# Patient Record
Sex: MALE | Race: WHITE | NOT HISPANIC OR LATINO
[De-identification: names, ages, dates, MRNs, and addresses within clinical notes are randomized per-mention and may not be internally consistent; named-entity substitution may affect disease eponyms.]

---

## 2020-06-19 ENCOUNTER — TRANSCRIPTION ENCOUNTER (OUTPATIENT)
Age: 55
End: 2020-06-19

## 2020-06-26 ENCOUNTER — TRANSCRIPTION ENCOUNTER (OUTPATIENT)
Age: 55
End: 2020-06-26

## 2020-09-19 ENCOUNTER — TRANSCRIPTION ENCOUNTER (OUTPATIENT)
Age: 55
End: 2020-09-19

## 2021-03-30 ENCOUNTER — TRANSCRIPTION ENCOUNTER (OUTPATIENT)
Age: 56
End: 2021-03-30

## 2022-04-26 PROBLEM — Z00.00 ENCOUNTER FOR PREVENTIVE HEALTH EXAMINATION: Status: ACTIVE | Noted: 2022-04-26

## 2022-04-27 ENCOUNTER — RESULT REVIEW (OUTPATIENT)
Age: 57
End: 2022-04-27

## 2022-04-27 ENCOUNTER — APPOINTMENT (OUTPATIENT)
Dept: ORTHOPEDIC SURGERY | Facility: CLINIC | Age: 57
End: 2022-04-27
Payer: COMMERCIAL

## 2022-04-27 VITALS — WEIGHT: 315 LBS | HEIGHT: 72 IN | BODY MASS INDEX: 42.66 KG/M2

## 2022-04-27 DIAGNOSIS — Z78.9 OTHER SPECIFIED HEALTH STATUS: ICD-10-CM

## 2022-04-27 DIAGNOSIS — I10 ESSENTIAL (PRIMARY) HYPERTENSION: ICD-10-CM

## 2022-04-27 DIAGNOSIS — Z87.891 PERSONAL HISTORY OF NICOTINE DEPENDENCE: ICD-10-CM

## 2022-04-27 PROCEDURE — 99214 OFFICE O/P EST MOD 30 MIN: CPT

## 2022-04-27 RX ORDER — OXYCODONE 10 MG/1
10 TABLET ORAL 4 TIMES DAILY
Qty: 120 | Refills: 0 | Status: ACTIVE | COMMUNITY
Start: 2022-04-27 | End: 1900-01-01

## 2022-04-28 PROBLEM — Z87.891 FORMER SMOKER: Status: ACTIVE | Noted: 2022-04-27

## 2022-04-28 PROBLEM — Z78.9 SOCIAL ALCOHOL USE: Status: ACTIVE | Noted: 2022-04-27

## 2022-04-28 RX ORDER — FLUOXETINE HYDROCHLORIDE 40 MG/1
40 CAPSULE ORAL
Qty: 180 | Refills: 0 | Status: ACTIVE | COMMUNITY
Start: 2022-02-11

## 2022-04-28 RX ORDER — ARIPIPRAZOLE 5 MG/1
5 TABLET ORAL
Qty: 90 | Refills: 0 | Status: ACTIVE | COMMUNITY
Start: 2022-04-25

## 2022-04-28 RX ORDER — AMOXICILLIN 875 MG/1
875 TABLET, FILM COATED ORAL
Qty: 20 | Refills: 0 | Status: ACTIVE | COMMUNITY
Start: 2022-03-31

## 2022-04-28 RX ORDER — POTASSIUM CHLORIDE 1500 MG/1
20 TABLET, EXTENDED RELEASE ORAL
Qty: 90 | Refills: 0 | Status: ACTIVE | COMMUNITY
Start: 2022-03-21

## 2022-04-28 RX ORDER — DOXYCYCLINE HYCLATE 100 MG/1
100 TABLET ORAL
Qty: 20 | Refills: 0 | Status: ACTIVE | COMMUNITY
Start: 2022-01-27

## 2022-04-28 RX ORDER — PANTOPRAZOLE 40 MG/1
40 TABLET, DELAYED RELEASE ORAL
Qty: 90 | Refills: 0 | Status: ACTIVE | COMMUNITY
Start: 2022-01-27

## 2022-04-28 RX ORDER — FUROSEMIDE 40 MG/1
40 TABLET ORAL
Qty: 60 | Refills: 0 | Status: ACTIVE | COMMUNITY
Start: 2022-03-31

## 2022-04-28 RX ORDER — TAMSULOSIN HYDROCHLORIDE 0.4 MG/1
0.4 CAPSULE ORAL
Qty: 180 | Refills: 0 | Status: ACTIVE | COMMUNITY
Start: 2022-04-25

## 2022-04-28 RX ORDER — AMOXICILLIN AND CLAVULANATE POTASSIUM 875; 125 MG/1; MG/1
875-125 TABLET, COATED ORAL
Qty: 20 | Refills: 0 | Status: ACTIVE | COMMUNITY
Start: 2022-02-11

## 2022-04-28 RX ORDER — ZOLPIDEM TARTRATE 10 MG/1
10 TABLET ORAL
Qty: 30 | Refills: 0 | Status: ACTIVE | COMMUNITY
Start: 2022-04-25

## 2022-04-28 RX ORDER — ALPRAZOLAM 1 MG/1
1 TABLET ORAL
Qty: 360 | Refills: 0 | Status: ACTIVE | COMMUNITY
Start: 2022-01-27

## 2022-04-28 RX ORDER — POTASSIUM CHLORIDE 1.5 G/1.58G
20 POWDER, FOR SOLUTION ORAL
Qty: 30 | Refills: 0 | Status: ACTIVE | COMMUNITY
Start: 2021-11-06

## 2022-04-28 RX ORDER — METOLAZONE 2.5 MG/1
2.5 TABLET ORAL
Qty: 45 | Refills: 0 | Status: ACTIVE | COMMUNITY
Start: 2022-02-21

## 2022-04-28 RX ORDER — GENTAMICIN SULFATE 1 MG/G
0.1 CREAM TOPICAL
Qty: 30 | Refills: 0 | Status: ACTIVE | COMMUNITY
Start: 2022-03-31

## 2022-04-28 RX ORDER — MONTELUKAST SODIUM 5 MG/1
5 TABLET, CHEWABLE ORAL
Qty: 180 | Refills: 0 | Status: ACTIVE | COMMUNITY
Start: 2022-04-25

## 2022-04-28 RX ORDER — BUDESONIDE 3 MG/1
3 CAPSULE, COATED PELLETS ORAL
Qty: 270 | Refills: 0 | Status: ACTIVE | COMMUNITY
Start: 2022-04-25

## 2022-04-28 RX ORDER — GABAPENTIN 300 MG/1
300 CAPSULE ORAL
Qty: 270 | Refills: 0 | Status: ACTIVE | COMMUNITY
Start: 2022-03-02

## 2022-04-28 RX ORDER — AMLODIPINE BESYLATE 5 MG/1
5 TABLET ORAL
Qty: 90 | Refills: 0 | Status: ACTIVE | COMMUNITY
Start: 2022-01-27

## 2022-04-28 NOTE — DISCUSSION/SUMMARY
[Medication Risks Reviewed] : Medication risks reviewed [de-identified] : Discussed proper body mechanics and modified physical activity to avoid aggravation of symptoms. Patient referred to get Doppler imaging to rule out blood clot. Patient will increase walking as best tolerated. Renewed prescription for formal physical therapy. Patient will continue treatment with meloxicam, oxycodone, and Gabapentin as prescribed. Advised patient of habit forming potential with analgesic drug use. Renewed prescriptions for meloxicam, oxycodone, and Keflex. Patient given prescription for Doppler study. Patient will follow up in 4 weeks. Pitting edema to the pretibial area and increased erythema and significant swelling with increase in calf circumference compared to contralateral side.

## 2022-04-28 NOTE — HISTORY OF PRESENT ILLNESS
[9] : 9 [Constant] : constant [de-identified] : pt states no changes since last visit. Patient states the severity of his pain has reduced since his previous visit. Treatment with physical therapy has provided relief. Patient has recent flare up of cellulitis which he is being treated for. Patient is being treated with Keflex. Patient also presents with RT lower extremity swelling and erythema. Patient states his walking capabilities have improved. Patient has continued with prescribed medical management which has provided relief.

## 2022-04-29 ENCOUNTER — APPOINTMENT (OUTPATIENT)
Dept: ORTHOPEDIC SURGERY | Facility: CLINIC | Age: 57
End: 2022-04-29

## 2022-05-25 ENCOUNTER — APPOINTMENT (OUTPATIENT)
Dept: ORTHOPEDIC SURGERY | Facility: CLINIC | Age: 57
End: 2022-05-25
Payer: COMMERCIAL

## 2022-05-25 VITALS — WEIGHT: 315 LBS | HEIGHT: 72 IN | BODY MASS INDEX: 42.66 KG/M2

## 2022-05-25 PROCEDURE — 99214 OFFICE O/P EST MOD 30 MIN: CPT

## 2022-05-25 RX ORDER — OXYCODONE 10 MG/1
10 TABLET ORAL 4 TIMES DAILY
Qty: 120 | Refills: 0 | Status: ACTIVE | COMMUNITY
Start: 2022-05-25 | End: 1900-01-01

## 2022-05-25 NOTE — DISCUSSION/SUMMARY
[Medication Risks Reviewed] : Medication risks reviewed [de-identified] : Discussed proper body mechanics and modified physical activity to avoid aggravation of symptoms. Discussed expected progression after spinal cord injury. Patient will continue walking as best tolerated and formal physical therapy. Patient will continue treatment with oxycodone and Gabapentin as prescribed. Advised patient of habit forming potential with analgesic drug use. Renewed prescriptions for Gabapentin, oxycodone, and formal physical therapy. Patient will follow up in 4 weeks.

## 2022-05-25 NOTE — HISTORY OF PRESENT ILLNESS
[de-identified] : Patient return to the office for routine four week follow-up. He continues to have back pain and thoracic pain with symptoms into bilateral legs. He walks with a cane at home but does utilize a wheeled walker in the community when he feels tired.\par \par He continues to take Neurontin, meloxicam, oxycodone. He is requesting a renewal of his narcotic medication and Neurontin today.\par \par Other symptoms remain essentially unchanged since last visit. The ultrasound was reportedly negative of the lower extremities for clot. The cellulitis has resolved in his right lower extremity. He's not currently taking antibiotics. He did have some weeping sore on the left lower extremity which is being followed by his PCP.

## 2022-06-29 ENCOUNTER — APPOINTMENT (OUTPATIENT)
Dept: ORTHOPEDIC SURGERY | Facility: CLINIC | Age: 57
End: 2022-06-29

## 2022-06-29 VITALS — WEIGHT: 315 LBS | BODY MASS INDEX: 42.66 KG/M2 | HEIGHT: 72 IN

## 2022-06-29 PROCEDURE — 99213 OFFICE O/P EST LOW 20 MIN: CPT

## 2022-06-29 PROCEDURE — 99214 OFFICE O/P EST MOD 30 MIN: CPT

## 2022-06-29 RX ORDER — OXYCODONE 10 MG/1
10 TABLET ORAL EVERY 6 HOURS
Qty: 120 | Refills: 0 | Status: ACTIVE | COMMUNITY
Start: 2022-06-29 | End: 1900-01-01

## 2022-07-04 NOTE — DISCUSSION/SUMMARY
[Medication Risks Reviewed] : Medication risks reviewed [de-identified] : Discussed proper body mechanics and modified physical activity to avoid aggravation of symptoms. Discussed expected progression after spinal cord injury. Patient will continue walking as best tolerated and formal physical therapy. Patient will continue treatment with oxycodone and Gabapentin as prescribed. Advised patient of habit forming potential with analgesic drug use. Renewed prescriptions for Oxycodone, and formal physical therapy. ISTOP paperwork reviewed. Patient will follow up in 4 weeks. Patient was advised to use heat when pain worsens. Patient also likely has olecranon bursitis. Advised patient to avoid leaning on objects to avoid aggravation of elbow and it will likely subside on its own.  \par \par Prior to appointment and during encounter with patient extensive medical records were reviewed including but not limited to, hospital records, outpatient records, imaging results, and lab data. During this appointment the patient was examined, diagnoses were discussed and explained in a face to face manner. In addition extensive time was spent reviewing aforementioned diagnostic studies. Counseling including abnormal image results, differential diagnoses, treatment options, risk and benefits, lifestyle changes, current condition, and current medications was performed. Patient's comments, questions, and concerns were addressed and patient verbalized understanding. Based on this patient's presentation at our office, which is an orthopedic spine surgeon's office, this patient inherently / intrinsically has a risk, however minute, of developing issues such as Cauda equina syndrome, bowel and bladder changes, or progression of motor or neurological deficits such as paralysis which may be permanent.\par \par The documentation recorded by the scribe, in my presence, accurately reflects the service that I personally performed and the decisions made by me with my edits as appropriate.

## 2022-07-04 NOTE — PHYSICAL EXAM
[Normal Coordination] : normal coordination [Normal DTR UE/LE] : normal DTR UE/LE  [Normal Sensation] : normal sensation [Normal Mood and Affect] : normal mood and affect [Orientated] : orientated [Able to Communicate] : able to communicate [Normal Skin] : normal skin [No Rash] : no rash [No Ulcers] : no ulcers [No Lesions] : no lesions [No obvious lymphadenopathy in areas examined] : no obvious lymphadenopathy in areas examined [Well Developed] : well developed [Well Nourished] : well nourished [Peripheral vascular exam is grossly normal] : peripheral vascular exam is grossly normal [No Respiratory Distress] : no respiratory distress [Lungs clear to auscultation bilaterally] : lungs clear to auscultation bilaterally [NL (90)] : forward flexion 90 degrees [NL (30)] : right lateral rotation 30 degrees [NL (45)] : extension 45 degrees [NL (40)] : right lateral bending 40 degrees [5___] : right extensor hallicus longus 5[unfilled]/5 [Right] : right elbow [] : non-antalgic

## 2022-07-04 NOTE — DATA REVIEWED
[Lumbar Spine] : lumbar spine [I independently reviewed and interpreted images and report] : I independently reviewed and interpreted images and report [I reviewed the films/CD and additionally noted] : I reviewed the films/CD and additionally noted [FreeTextEntry1] : X-Ray Examination of the LUMBAR SPINE 2 views shows implants in position, no loosening of hardware and fusion intact. X-ray views used: AP and lateral. \par \par I stop paperwork reviewed\par PT progress notes reviewed

## 2022-07-27 ENCOUNTER — APPOINTMENT (OUTPATIENT)
Dept: ORTHOPEDIC SURGERY | Facility: CLINIC | Age: 57
End: 2022-07-27
Payer: COMMERCIAL

## 2022-07-27 VITALS — WEIGHT: 315 LBS | HEIGHT: 72 IN | BODY MASS INDEX: 42.66 KG/M2

## 2022-07-27 PROCEDURE — 99213 OFFICE O/P EST LOW 20 MIN: CPT

## 2022-07-27 PROCEDURE — 99214 OFFICE O/P EST MOD 30 MIN: CPT

## 2022-07-27 RX ORDER — GABAPENTIN 300 MG/1
300 CAPSULE ORAL 3 TIMES DAILY
Qty: 90 | Refills: 2 | Status: ACTIVE | COMMUNITY
Start: 2022-07-27 | End: 1900-01-01

## 2022-08-01 NOTE — DISCUSSION/SUMMARY
[Medication Risks Reviewed] : Medication risks reviewed [de-identified] : Discussed proper body mechanics and modified physical activity to avoid aggravation of symptoms. Discussed expected progression after spinal cord injury. Patient will continue walking as best tolerated and formal physical therapy. Patient will continue treatment with oxycodone and Gabapentin as prescribed. Advised patient of habit forming potential with analgesic drug use. Renewed prescriptions for Oxycodone. ISTOP paperwork reviewed.  Patient was advised to use heat when pain worsens. Patient will follow up in 6 weeks. \par \par Prior to appointment and during encounter with patient extensive medical records were reviewed including but not limited to, hospital records, outpatient records, imaging results, and lab data. During this appointment the patient was examined, diagnoses were discussed and explained in a face to face manner. In addition extensive time was spent reviewing aforementioned diagnostic studies. Counseling including abnormal image results, differential diagnoses, treatment options, risk and benefits, lifestyle changes, current condition, and current medications was performed. Patient's comments, questions, and concerns were addressed and patient verbalized understanding. Based on this patient's presentation at our office, which is an orthopedic spine surgeon's office, this patient inherently / intrinsically has a risk, however minute, of developing issues such as Cauda equina syndrome, bowel and bladder changes, or progression of motor or neurological deficits such as paralysis which may be permanent.\par \par The documentation recorded by the scribe, in my presence, accurately reflects the service that I personally performed and the decisions made by me with my edits as appropriate.

## 2022-08-01 NOTE — PHYSICAL EXAM
[Normal Coordination] : normal coordination [Normal DTR UE/LE] : normal DTR UE/LE  [Normal Sensation] : normal sensation [Normal Mood and Affect] : normal mood and affect [Orientated] : orientated [Able to Communicate] : able to communicate [Normal Skin] : normal skin [No Rash] : no rash [No Ulcers] : no ulcers [No Lesions] : no lesions [No obvious lymphadenopathy in areas examined] : no obvious lymphadenopathy in areas examined [Well Developed] : well developed [Well Nourished] : well nourished [Peripheral vascular exam is grossly normal] : peripheral vascular exam is grossly normal [No Respiratory Distress] : no respiratory distress [Lungs clear to auscultation bilaterally] : lungs clear to auscultation bilaterally [5___] : right extensor hallicus longus 5[unfilled]/5 [Right] : right elbow [] : clonus not sustained at ankle

## 2022-08-01 NOTE — HISTORY OF PRESENT ILLNESS
[de-identified] : Patient returns to office for a year post op. He continues to have back pain and thoracic pain with symptoms into bilateral legs. Reports strength has increased in BLE. Patient reports strength in LT leg > RT leg. Aggravated RT thigh pain anteriorly/ posteriorly with prolonged standing. He reports instability with balance. Patient is using walker. Patient finds PT very helpful with symptom/strength improvement.

## 2022-08-24 ENCOUNTER — APPOINTMENT (OUTPATIENT)
Dept: ORTHOPEDIC SURGERY | Facility: CLINIC | Age: 57
End: 2022-08-24

## 2022-08-24 VITALS — WEIGHT: 315 LBS | HEIGHT: 72 IN | BODY MASS INDEX: 42.66 KG/M2

## 2022-08-24 PROCEDURE — 99214 OFFICE O/P EST MOD 30 MIN: CPT

## 2022-08-24 NOTE — HISTORY OF PRESENT ILLNESS
[de-identified] : Patient returns to office for a FU.  He continues to have back pain and thoracic pain with symptoms into bilateral legs. Reports strength has increased in BLE. Patient reports strength in LT leg > RT leg. Aggravated RT thigh pain anteriorly/ posteriorly with prolonged standing. He reports instability with balance, but states it is slowly improving. Patient is using walker in office today, and cane periodically at home. Patient finds PT very helpful with symptom/strength improvement. Continues to have paresthesias in the RLE. Reports paresthesias in the RT leg are worse than the RT. Patient states he is slowly making progress with mobility. \par \par

## 2022-08-24 NOTE — DISCUSSION/SUMMARY
[Medication Risks Reviewed] : Medication risks reviewed [de-identified] : Discussed proper body mechanics and modified physical activity to avoid aggravation of symptoms. Discussed expected progression after spinal cord injury. Patient was advised keep up with PT in order to further advance recovery. Patient will continue walking as best tolerated and formal physical therapy. Renewed PT. Patient will continue treatment with oxycodone, Gabapentin, and Meloxicam as prescribed. Advised patient of habit forming potential with analgesic drug use. Renewed prescriptions for Oxycodone. ISTOP paperwork reviewed.  Patient was advised to use heat when pain worsens. Patient will follow up in 4 weeks. \karlene \karlene KAUR Acting as a Scribe for Carissa Womack, attest that this documentation has been prepared under the direction and in the presence of Provider Jonas Berry MD. \karlene \karlene

## 2022-09-21 ENCOUNTER — APPOINTMENT (OUTPATIENT)
Dept: ORTHOPEDIC SURGERY | Facility: CLINIC | Age: 57
End: 2022-09-21

## 2022-09-21 VITALS — WEIGHT: 315 LBS | BODY MASS INDEX: 42.66 KG/M2 | HEIGHT: 72 IN

## 2022-09-21 PROCEDURE — 72100 X-RAY EXAM L-S SPINE 2/3 VWS: CPT

## 2022-09-21 PROCEDURE — 99214 OFFICE O/P EST MOD 30 MIN: CPT

## 2022-09-21 RX ORDER — OXYCODONE 10 MG/1
10 TABLET ORAL 4 TIMES DAILY
Qty: 120 | Refills: 0 | Status: ACTIVE | COMMUNITY
Start: 2022-09-21 | End: 1900-01-01

## 2022-09-22 NOTE — DATA REVIEWED
[Lumbar Spine] : lumbar spine [I independently reviewed and interpreted images and report] : I independently reviewed and interpreted images and report [I reviewed the films/CD and additionally noted] : I reviewed the films/CD and additionally noted [FreeTextEntry1] : I stop paperwork reviewed\par PT progress notes reviewed

## 2022-09-22 NOTE — PHYSICAL EXAM
[] : uses walker [de-identified] : Constitutional:\par - General Appearance:\par Unremarkable\par Body Habitus\par Well Developed\par Well Nourished\par Body Habitus\par No Deformities\par Well Groomed\par Ability To communicate:\par Normal\par Neurologic:\par Global sensation is intact to upper and lower extremities. See examination of Neck and/or Spine\par for exceptions.\par Orientation to Time, Place and Person is: Normal\par Mood And Affect is Normal\par Skin:\par - Head/Face, Right Upper/Lower Extremity, Left Upper/Lower Extremity: Normal\par See Examination of Neck and/or Spine for exceptions\par Cardiovascular:\par Peripheral Cardiovascular System is Normal\par Palpation of Lymph Nodes:\par Normal Palpation of lymph nodes in: Axilla, Cervical, Inguinal\par Abnormal Palpation of lymph nodes in: None  [FreeTextEntry1] : \par Good maintenance of alignment and implant placement. Apparent solid fusion with loss disc space height L5-S1 . facet hypertrophy L2-3.

## 2022-09-22 NOTE — DISCUSSION/SUMMARY
[de-identified] : Patient received x-ray in office today. Patient will continue PT for low back and lower extremity stretching / strengthening, gait training, and balance treatment. Renewed prescriptions for Gabapentin and Oxycodone.\par \par

## 2022-09-22 NOTE — HISTORY OF PRESENT ILLNESS
[de-identified] : The patient is a 57 year male  who presents today follow up for low back pain\par Date of Injury/Onset:\par Pain:    At Rest: 7/10 \par With Activity:  9/10 \par Mechanism of injury: \par Quality of symptoms: \par Improves with: \par Worse with: _\par Treatment/Imaging/Studies Since Last Visit: physical therapy\par Reports Available For Review Today: _\par Change since last visit: \par Additional Information: None\par \par Patient has been attending Pt - which have been slowly improving his symptoms. He is using a cane to ambulate up and down the stairs, as well as use of walker in office today. Decreased sodium levels as well as swelling in the lower extremities -  he has been following up with Cardiology. He is taking Lisinopril. \par \par

## 2022-10-19 ENCOUNTER — APPOINTMENT (OUTPATIENT)
Dept: ORTHOPEDIC SURGERY | Facility: CLINIC | Age: 57
End: 2022-10-19

## 2022-10-19 VITALS — HEIGHT: 72 IN | BODY MASS INDEX: 42.66 KG/M2 | WEIGHT: 315 LBS

## 2022-10-19 PROCEDURE — 99214 OFFICE O/P EST MOD 30 MIN: CPT

## 2022-10-19 RX ORDER — OXYCODONE 10 MG/1
10 TABLET ORAL 4 TIMES DAILY
Qty: 120 | Refills: 0 | Status: ACTIVE | COMMUNITY
Start: 2022-10-19 | End: 1900-01-01

## 2022-10-23 NOTE — DISCUSSION/SUMMARY
[de-identified] : Patient will continue PT for low back and lower extremity stretching / strengthening, gait training, and balance treatment. Renewed pt prescription. Patient will f/u in 4 weeks. Renewed prescriptions for  oxycodone.\par \par Prior to appointment and during encounter with patient extensive medical records were reviewed including but not limited to, hospital records, outpatient records, imaging results, and lab data.During this appointment the patient was examined, diagnoses were discussed and explained in a face to face manner. In addition extensive time was spent reviewing aforementioned diagnostic studies. Counseling including abnormal image results, differential diagnoses, treatment options, risk and benefits, lifestyle changes, current condition, and current medications was performed. Patient's comments, questions, and concerns were addressed and patient verbalized understanding. Based on this patient's presentation at our office, which is an orthopedic spine surgeon's office, this patient inherently / intrinsically has a risk, however minute, of developing issues such as Cauda equina syndrome, bowel and bladder changes, or progression of motor or neurological deficits such as paralysis which may be permanent.\par \par CARISSA VALLADARES Acting as a Scribe for Dr. Bibi TRAN, Carissa Valladares, attest that this documentation has been prepared under the direction and in the presence of Provider Jonas Berry MD. \par \par

## 2022-10-23 NOTE — HISTORY OF PRESENT ILLNESS
[de-identified] : 10/19/2022 - Patient presents in follow up. Recent flare up of cellulitis rt lower extremity, he has been taking antibiotics. Patients rt lower extremity is red/pink in office today. Inconsistency in physical therapy due to cellulitis flare up, but he is eager to return. Patient has been routinely incorporating exercises from PT into his daily routine. Denies sciatic pain, but reports posterior thigh pain. Patient is ambulating in office today with walker. C/o numbness in the rt foot, as well as weakness in the RLE. \par \par 09/21/2022: The patient is a 57 year male  who presents today follow up for low back pain\par Date of Injury/Onset:\par Pain:    At Rest: 7/10 \par With Activity:  9/10 \par Mechanism of injury: \par Quality of symptoms: \par Improves with: \par Worse with: _\par Treatment/Imaging/Studies Since Last Visit: physical therapy\par Reports Available For Review Today: _\par Change since last visit: \par Additional Information: None\par \par Patient has been attending Pt - which have been slowly improving his symptoms. He is using a cane to ambulate up and down the stairs, as well as use of walker in office today. Decreased sodium levels as well as swelling in the lower extremities -  he has been following up with Cardiology. He is taking Lisinopril. \par \par

## 2022-10-23 NOTE — PHYSICAL EXAM
[] : uses walker [Normal Coordination] : normal coordination [Normal DTR UE/LE] : normal DTR UE/LE  [Normal Sensation] : normal sensation [Normal Mood and Affect] : normal mood and affect [Orientated] : orientated [Able to Communicate] : able to communicate [Normal Skin] : normal skin [No Rash] : no rash [No Ulcers] : no ulcers [No Lesions] : no lesions [No obvious lymphadenopathy in areas examined] : no obvious lymphadenopathy in areas examined [Well Developed] : well developed [Well Nourished] : well nourished [Peripheral vascular exam is grossly normal] : peripheral vascular exam is grossly normal [No Respiratory Distress] : no respiratory distress [de-identified] : Constitutional:\par - General Appearance:\par Unremarkable\par Body Habitus\par Well Developed\par Well Nourished\par Body Habitus\par No Deformities\par Well Groomed\par Ability To communicate:\par Normal\par Neurologic:\par Global sensation is intact to upper and lower extremities. See examination of Neck and/or Spine\par for exceptions.\par Orientation to Time, Place and Person is: Normal\par Mood And Affect is Normal\par Skin:\par - Head/Face, Right Upper/Lower Extremity, Left Upper/Lower Extremity: Normal\par See Examination of Neck and/or Spine for exceptions\par Cardiovascular:\par Peripheral Cardiovascular System is Normal\par Palpation of Lymph Nodes:\par Normal Palpation of lymph nodes in: Axilla, Cervical, Inguinal\par Abnormal Palpation of lymph nodes in: None  [FreeTextEntry1] : \par Good maintenance of alignment and implant placement. Apparent solid fusion with loss disc space height L5-S1 . facet hypertrophy L2-3.  [FreeTextEntry3] : cellulitis right lower extremity

## 2022-11-16 ENCOUNTER — APPOINTMENT (OUTPATIENT)
Dept: ORTHOPEDIC SURGERY | Facility: CLINIC | Age: 57
End: 2022-11-16
Payer: COMMERCIAL

## 2022-11-16 VITALS — WEIGHT: 315 LBS | BODY MASS INDEX: 42.66 KG/M2 | HEIGHT: 72 IN

## 2022-11-16 PROCEDURE — 99214 OFFICE O/P EST MOD 30 MIN: CPT

## 2022-11-16 PROCEDURE — 99213 OFFICE O/P EST LOW 20 MIN: CPT

## 2022-11-16 RX ORDER — OXYCODONE 10 MG/1
10 TABLET ORAL 4 TIMES DAILY
Qty: 120 | Refills: 0 | Status: ACTIVE | COMMUNITY
Start: 2022-11-16 | End: 1900-01-01

## 2022-11-20 NOTE — HISTORY OF PRESENT ILLNESS
[de-identified] : 11/16/2022 - 58 y/o male presenting in follow up. Continues to have edema in the lower extremities. Following up with vascular specialist, infection was ruled out. Continues to ambulate with walker. States 20 lb antwon loss. Blood sugar levels are controlled. Currently in physical therapy and completing home exercise rehab. Balance is slowly improving.  Strength in the BLE slowly improving with time. Numbness in the right leg > left leg. Pain level remains the same. Continues to have episodic sciatic pain in the right thigh, worse while ambulating. Using Neurontin for symptom control. No changes in bowel/bladder function. Oxycodone, Meloxicam, and Gabapentin helpful for symptom control. \par \par 10/19/2022 - Patient presents in follow up. Recent flare up of cellulitis rt lower extremity, he has been taking antibiotics. Patients rt lower extremity is red/pink in office today. Inconsistency in physical therapy due to cellulitis flare up, but he is eager to return. Patient has been routinely incorporating exercises from PT into his daily routine. Denies sciatic pain, but reports posterior thigh pain. Patient is ambulating in office today with walker. C/o numbness in the rt foot, as well as weakness in the RLE. \par \par 09/21/2022: The patient is a 57 year male  who presents today follow up for low back pain\par Date of Injury/Onset:\par Pain:    At Rest: 7/10 \par With Activity:  9/10 \par Mechanism of injury: \par Quality of symptoms: \par Improves with: \par Worse with: _\par Treatment/Imaging/Studies Since Last Visit: physical therapy\par Reports Available For Review Today: _\par Change since last visit: \par Additional Information: None\par \par Patient has been attending Pt - which have been slowly improving his symptoms. He is using a cane to ambulate up and down the stairs, as well as use of walker in office today. Decreased sodium levels as well as swelling in the lower extremities -  he has been following up with Cardiology. He is taking Lisinopril. \par \par

## 2022-11-20 NOTE — DISCUSSION/SUMMARY
[Medication Risks Reviewed] : Medication risks reviewed [de-identified] : Patient will continue PT for low back and lower extremity stretching / strengthening, gait training, and balance treatment. Patient advised to walk short distances more frequently to continue building strength. Renewed prescriptions for oxycodone and Meloxicam. Patient will continue treatment with gabapentin. F/U in 4 weeks. \par \par Prior to appointment and during encounter with patient extensive medical records were reviewed including but not limited to, hospital records, outpatient records, imaging results, and lab data.During this appointment the patient was examined, diagnoses were discussed and explained in a face to face manner. In addition extensive time was spent reviewing aforementioned diagnostic studies. Counseling including abnormal image results, differential diagnoses, treatment options, risk and benefits, lifestyle changes, current condition, and current medications was performed. Patient's comments, questions, and concerns were addressed and patient verbalized understanding. Based on this patient's presentation at our office, which is an orthopedic spine surgeon's office, this patient inherently / intrinsically has a risk, however minute, of developing issues such as Cauda equina syndrome, bowel and bladder changes, or progression of motor or neurological deficits such as paralysis which may be permanent.\par \par CARISSA VALLADARES Acting as a Scribe for Dr. Bibi TRAN, Carissa Valladares, attest that this documentation has been prepared under the direction and in the presence of Provider Jonas Berry MD. \par \par

## 2022-11-20 NOTE — PHYSICAL EXAM
[Normal Coordination] : normal coordination [Normal DTR UE/LE] : normal DTR UE/LE  [Normal Sensation] : normal sensation [Normal Mood and Affect] : normal mood and affect [Orientated] : orientated [Able to Communicate] : able to communicate [Normal Skin] : normal skin [No Rash] : no rash [No Ulcers] : no ulcers [No Lesions] : no lesions [No obvious lymphadenopathy in areas examined] : no obvious lymphadenopathy in areas examined [Well Developed] : well developed [Well Nourished] : well nourished [Peripheral vascular exam is grossly normal] : peripheral vascular exam is grossly normal [No Respiratory Distress] : no respiratory distress [] : uses walker [de-identified] : Constitutional:\par - General Appearance:\par Unremarkable\par Body Habitus\par Well Developed\par Well Nourished\par Body Habitus\par No Deformities\par Well Groomed\par Ability To communicate:\par Normal\par Neurologic:\par Global sensation is intact to upper and lower extremities. See examination of Neck and/or Spine\par for exceptions.\par Orientation to Time, Place and Person is: Normal\par Mood And Affect is Normal\par Skin:\par - Head/Face, Right Upper/Lower Extremity, Left Upper/Lower Extremity: Normal\par See Examination of Neck and/or Spine for exceptions\par Cardiovascular:\par Peripheral Cardiovascular System is Normal\par Palpation of Lymph Nodes:\par Normal Palpation of lymph nodes in: Axilla, Cervical, Inguinal\par Abnormal Palpation of lymph nodes in: None  [FreeTextEntry3] : edema b/l lower extremities. Redness in the distal lower extremities.

## 2022-12-14 ENCOUNTER — APPOINTMENT (OUTPATIENT)
Dept: ORTHOPEDIC SURGERY | Facility: CLINIC | Age: 57
End: 2022-12-14

## 2022-12-14 VITALS — BODY MASS INDEX: 42.66 KG/M2 | WEIGHT: 315 LBS | HEIGHT: 72 IN

## 2022-12-14 PROCEDURE — 99214 OFFICE O/P EST MOD 30 MIN: CPT

## 2022-12-14 RX ORDER — OXYCODONE 10 MG/1
10 TABLET ORAL 4 TIMES DAILY
Qty: 120 | Refills: 0 | Status: ACTIVE | COMMUNITY
Start: 2022-12-14 | End: 1900-01-01

## 2022-12-15 NOTE — HISTORY OF PRESENT ILLNESS
[de-identified] : 12/14/2022: \par The patient is a 57 year male  who presents today follow up low back. Patient returns to the office for routine follow up. Lasting approximately one month ago. He has followed up with vascular and has multiple studies pending to evaluate lower extremity vascular function, including Doppler and CÉSAR.\par \par He request renewal of medication‘s in terms of gabapentin, narcotics and anti-inflammatory. He continues to walk with a walker. He’s making progress with lower extremity wound healing and is no longer taking antibiotics.\par \par Pain:    At Rest: 7/10 \par With Activity:  9/10 \par \par \par 11/16/2022 - 56 y/o male presenting in follow up. Continues to have edema in the lower extremities. Following up with vascular specialist, infection was ruled out. Continues to ambulate with walker. States 20 lb antwon loss. Blood sugar levels are controlled. Currently in physical therapy and completing home exercise rehab. Balance is slowly improving.  Strength in the BLE slowly improving with time. Numbness in the right leg > left leg. Pain level remains the same. Continues to have episodic sciatic pain in the right thigh, worse while ambulating. Using Neurontin for symptom control. No changes in bowel/bladder function. Oxycodone, Meloxicam, and Gabapentin helpful for symptom control. \par \par 10/19/2022 - Patient presents in follow up. Recent flare up of cellulitis rt lower extremity, he has been taking antibiotics. Patients rt lower extremity is red/pink in office today. Inconsistency in physical therapy due to cellulitis flare up, but he is eager to return. Patient has been routinely incorporating exercises from PT into his daily routine. Denies sciatic pain, but reports posterior thigh pain. Patient is ambulating in office today with walker. C/o numbness in the rt foot, as well as weakness in the RLE. \par \par 09/21/2022: The patient is a 57 year male  who presents today follow up for low back pain\par Date of Injury/Onset:\par Pain:    At Rest: 7/10 \par With Activity:  9/10 \par Mechanism of injury: \par Quality of symptoms: \par Improves with: \par Worse with: _\par Treatment/Imaging/Studies Since Last Visit: physical therapy\par Reports Available For Review Today: _\par Change since last visit: \par Additional Information: None\par \par Patient has been attending Pt - which have been slowly improving his symptoms. He is using a cane to ambulate up and down the stairs, as well as use of walker in office today. Decreased sodium levels as well as swelling in the lower extremities -  he has been following up with Cardiology. He is taking Lisinopril. \par \par

## 2022-12-15 NOTE — PHYSICAL EXAM
[Normal Coordination] : normal coordination [Normal DTR UE/LE] : normal DTR UE/LE  [Normal Sensation] : normal sensation [Normal Mood and Affect] : normal mood and affect [Orientated] : orientated [Able to Communicate] : able to communicate [Normal Skin] : normal skin [No Rash] : no rash [No Ulcers] : no ulcers [No Lesions] : no lesions [No obvious lymphadenopathy in areas examined] : no obvious lymphadenopathy in areas examined [Well Developed] : well developed [Well Nourished] : well nourished [No Respiratory Distress] : no respiratory distress [Flexion] : flexion [Extension] : extension [4___] : right quadriceps 4[unfilled]/5 [de-identified] : bilateral pedaal edema [] : negative straight leg raise [de-identified] : Patient is ambulatory with an antalgic gait using a wheeled walker. [FreeTextEntry3] : 3+ Edema bilateral lower extremities with some skin changes. [de-identified] : Bilateral global weakness in the lower extremities essentially unchanged as prior examination. [de-identified] : Bilateral global weakness in the lower extremities essentially unchanged as prior examination.

## 2022-12-15 NOTE — DISCUSSION/SUMMARY
[de-identified] : Physical therapy prescription was renewed. He will continue to work on lower extremity strengthening, core muscle strengthening, balance training, and gait training. I stop was completed a medication or renewed. We’ll see him for follow up in four weeks. He will follow up with vascular as previously discussed.

## 2023-01-18 ENCOUNTER — APPOINTMENT (OUTPATIENT)
Dept: ORTHOPEDIC SURGERY | Facility: CLINIC | Age: 58
End: 2023-01-18
Payer: COMMERCIAL

## 2023-01-18 VITALS — BODY MASS INDEX: 42.66 KG/M2 | WEIGHT: 315 LBS | HEIGHT: 72 IN

## 2023-01-18 PROCEDURE — 99214 OFFICE O/P EST MOD 30 MIN: CPT

## 2023-01-19 NOTE — DATA REVIEWED
[FreeTextEntry1] : I stop paperwork reviewed\par PT progress notes reviewed\par Vascular progress notes reviewed

## 2023-01-19 NOTE — HISTORY OF PRESENT ILLNESS
[de-identified] : 01/18/2023 - 58 y/o male presenting for a f/u lumbar spine. Currently enrolled in PT, which is helpful. He is gradually moving in the right direction, especially with his mobility and balance. He continues to ambulate with use of the walker. His chief complaint is b/l legs locking up, specifically while standing up. current medication regimen is helpful for symptom control. General health is good. Recent DOPLLER Exam and other vascular studies performed, he is scheduled to review the results. The atrophy and swelling in the right calf is improving with time. \par \par 12/14/2022 -The patient is a 57 year male  who presents today follow up low back. Patient returns to the office for routine follow up. Lasting approximately one month ago. He has followed up with vascular and has multiple studies pending to evaluate lower extremity vascular function, including Doppler and CÉSAR.\par \par He request renewal of medication‘s in terms of gabapentin, narcotics and anti-inflammatory. He continues to walk with a walker. He’s making progress with lower extremity wound healing and is no longer taking antibiotics.\par \par Pain:    At Rest: 7/10 \par With Activity:  9/10 \par \par 11/16/2022 - 58 y/o male presenting in follow up. Continues to have edema in the lower extremities. Following up with vascular specialist, infection was ruled out. Continues to ambulate with walker. States 20 lb antwon loss. Blood sugar levels are controlled. Currently in physical therapy and completing home exercise rehab. Balance is slowly improving.  Strength in the BLE slowly improving with time. Numbness in the right leg > left leg. Pain level remains the same. Continues to have episodic sciatic pain in the right thigh, worse while ambulating. Using Neurontin for symptom control. No changes in bowel/bladder function. Oxycodone, Meloxicam, and Gabapentin helpful for symptom control. \par \par 10/19/2022 - Patient presents in follow up. Recent flare up of cellulitis rt lower extremity, he has been taking antibiotics. Patients rt lower extremity is red/pink in office today. Inconsistency in physical therapy due to cellulitis flare up, but he is eager to return. Patient has been routinely incorporating exercises from PT into his daily routine. Denies sciatic pain, but reports posterior thigh pain. Patient is ambulating in office today with walker. C/o numbness in the rt foot, as well as weakness in the RLE. \par \par 09/21/2022: The patient is a 57 year male  who presents today follow up for low back pain\par Date of Injury/Onset:\par Pain:    At Rest: 7/10 \par With Activity:  9/10 \par Mechanism of injury: \par Quality of symptoms: \par Improves with: \par Worse with: _\par Treatment/Imaging/Studies Since Last Visit: physical therapy\par Reports Available For Review Today: _\par Change since last visit: \par Additional Information: None\par \par Patient has been attending Pt - which have been slowly improving his symptoms. He is using a cane to ambulate up and down the stairs, as well as use of walker in office today. Decreased sodium levels as well as swelling in the lower extremities -  he has been following up with Cardiology. He is taking Lisinopril. \par \par

## 2023-01-19 NOTE — DISCUSSION/SUMMARY
[de-identified] : Physical therapy prescription was renewed as this is beneficial in terms of gradual symptom improvement. He will continue to work on lower extremity strengthening, core muscle strengthening, balance training, and gait training. I stop was completed and medications were renewed. We’ll see him for follow up in four weeks. He will follow up with vascular as previously directed. \par \par Prior to appointment and during encounter with patient extensive medical records were reviewed including but not limited to, hospital records, outpatient records, imaging results, and lab data.During this appointment the patient was examined, diagnoses were discussed and explained in a face to face manner. In addition extensive time was spent reviewing aforementioned diagnostic studies. Counseling including abnormal image results, differential diagnoses, treatment options, risk and benefits, lifestyle changes, current condition, and current medications was performed. Patient's comments, questions, and concerns were addressed and patient verbalized understanding. Based on this patient's presentation at our office, which is an orthopedic spine surgeon's office, this patient inherently / intrinsically has a risk, however minute, of developing issues such as Cauda equina syndrome, bowel and bladder changes, or progression of motor or neurological deficits such as paralysis which may be permanent.\par \par CARISSA VALLADARES Acting as a Scribe for Dr. Mtz I, Carissa Valladares, attest that this documentation has been prepared under the direction and in the presence of Provider Jonas Berry MD.

## 2023-01-19 NOTE — PHYSICAL EXAM
[Normal Coordination] : normal coordination [Normal DTR UE/LE] : normal DTR UE/LE  [Normal Sensation] : normal sensation [Normal Mood and Affect] : normal mood and affect [Orientated] : orientated [Able to Communicate] : able to communicate [Normal Skin] : normal skin [No Rash] : no rash [No Ulcers] : no ulcers [No Lesions] : no lesions [No obvious lymphadenopathy in areas examined] : no obvious lymphadenopathy in areas examined [Well Developed] : well developed [Well Nourished] : well nourished [No Respiratory Distress] : no respiratory distress [Flexion] : flexion [Extension] : extension [4___] : right quadriceps 4[unfilled]/5 [de-identified] : bilateral pedaal edema [] : negative straight leg raise [de-identified] : Patient is ambulatory with an antalgic gait using a wheeled walker. [FreeTextEntry3] : 3+ Edema bilateral lower extremities with some skin changes. [de-identified] : Bilateral global weakness in the lower extremities essentially unchanged as prior examination.

## 2023-02-15 ENCOUNTER — APPOINTMENT (OUTPATIENT)
Dept: ORTHOPEDIC SURGERY | Facility: CLINIC | Age: 58
End: 2023-02-15
Payer: COMMERCIAL

## 2023-02-15 VITALS — BODY MASS INDEX: 40.43 KG/M2 | WEIGHT: 315 LBS | HEIGHT: 74 IN

## 2023-02-15 PROCEDURE — 99214 OFFICE O/P EST MOD 30 MIN: CPT

## 2023-02-15 RX ORDER — OXYCODONE 10 MG/1
10 TABLET ORAL 4 TIMES DAILY
Qty: 120 | Refills: 0 | Status: ACTIVE | COMMUNITY
Start: 2023-02-15 | End: 1900-01-01

## 2023-02-15 NOTE — PHYSICAL EXAM
[Flexion] : flexion [Extension] : extension [4___] : right quadriceps 4[unfilled]/5 [Normal Coordination] : normal coordination [Normal DTR UE/LE] : normal DTR UE/LE  [Normal Sensation] : normal sensation [Normal Mood and Affect] : normal mood and affect [Orientated] : orientated [Able to Communicate] : able to communicate [Normal Skin] : normal skin [No Rash] : no rash [No Ulcers] : no ulcers [No Lesions] : no lesions [No obvious lymphadenopathy in areas examined] : no obvious lymphadenopathy in areas examined [Well Developed] : well developed [Well Nourished] : well nourished [No Respiratory Distress] : no respiratory distress [de-identified] : Constitutional:\par - General Appearance:\par Unremarkable\par Body Habitus\par Well Developed\par Well Nourished\par Body Habitus\par No Deformities\par Well Groomed\par Ability To communicate:\par Normal\par Neurologic:\par Global sensation is intact to upper and lower extremities. See examination of Neck and/or Spine\par for exceptions.\par Orientation to Time, Place and Person is: Normal\par Mood And Affect is Normal\par Skin:\par - Head/Face, Right Upper/Lower Extremity, Left Upper/Lower Extremity: Normal\par See Examination of Neck and/or Spine for exceptions\par Cardiovascular:\par Peripheral Cardiovascular System is Normal\par Palpation of Lymph Nodes:\par Normal Palpation of lymph nodes in: Axilla, Cervical, Inguinal\par Abnormal Palpation of lymph nodes in: None \par \par L spine Exam \par Bilateral lower extremity weakness, 4/5 with regard to hip flexors and quad strength bilaterally\par Vascular changes and edema to the bilateral lower extremities\par  [de-identified] : right leg swelling, errythema [] : negative straight leg raise [de-identified] : Patient is ambulatory with an antalgic gait using a wheeled walker. [FreeTextEntry3] : 3+ Edema bilateral lower extremities with some skin changes. [de-identified] : Bilateral global weakness in the lower extremities essentially unchanged as prior examination.

## 2023-02-15 NOTE — HISTORY OF PRESENT ILLNESS
[de-identified] : 02/15/2023 - Patient returns to the office for follow. Last seen approximate one month ago. Continues with bilateral lower extremity weakness. He continues to walk with a wheeled walker. Lower extremity vascular disease essentially unchanged. He has seen the Edema clinic recently for consultation. He has begun to develop weeping in the right shin today.\par Pain:    At Rest: 8/10 \par With Activity:  9/10 \par Treatment: physical therapy\par Additional Information: accepted to lymphedema clinic\par \par 01/18/2023 - 56 y/o male presenting for a f/u lumbar spine. Currently enrolled in PT, which is helpful. He is gradually moving in the right direction, especially with his mobility and balance. He continues to ambulate with use of the walker. His chief complaint is b/l legs locking up, specifically while standing up. current medication regimen is helpful for symptom control. General health is good. Recent DOPLLER Exam and other vascular studies performed, he is scheduled to review the results. The atrophy and swelling in the right calf is improving with time. \par \par 12/14/2022 -The patient is a 57 year male  who presents today follow up low back. Patient returns to the office for routine follow up. Lasting approximately one month ago. He has followed up with vascular and has multiple studies pending to evaluate lower extremity vascular function, including Doppler and CÉSAR.\par \par He request renewal of medication‘s in terms of gabapentin, narcotics and anti-inflammatory. He continues to walk with a walker. He’s making progress with lower extremity wound healing and is no longer taking antibiotics.\par \par Pain:    At Rest: 7/10 \par With Activity:  9/10 \par \par 11/16/2022 - 56 y/o male presenting in follow up. Continues to have edema in the lower extremities. Following up with vascular specialist, infection was ruled out. Continues to ambulate with walker. States 20 lb antwon loss. Blood sugar levels are controlled. Currently in physical therapy and completing home exercise rehab. Balance is slowly improving.  Strength in the BLE slowly improving with time. Numbness in the right leg > left leg. Pain level remains the same. Continues to have episodic sciatic pain in the right thigh, worse while ambulating. Using Neurontin for symptom control. No changes in bowel/bladder function. Oxycodone, Meloxicam, and Gabapentin helpful for symptom control. \par \par 10/19/2022 - Patient presents in follow up. Recent flare up of cellulitis rt lower extremity, he has been taking antibiotics. Patients rt lower extremity is red/pink in office today. Inconsistency in physical therapy due to cellulitis flare up, but he is eager to return. Patient has been routinely incorporating exercises from PT into his daily routine. Denies sciatic pain, but reports posterior thigh pain. Patient is ambulating in office today with walker. C/o numbness in the rt foot, as well as weakness in the RLE. \par \par 09/21/2022: The patient is a 57 year male  who presents today follow up for low back pain\par Date of Injury/Onset:\par Pain:    At Rest: 7/10 \par With Activity:  9/10 \par Mechanism of injury: \par Quality of symptoms: \par Improves with: \par Worse with: _\par Treatment/Imaging/Studies Since Last Visit: physical therapy\par Reports Available For Review Today: _\par Change since last visit: \par Additional Information: None\par \par Patient has been attending Pt - which have been slowly improving his symptoms. He is using a cane to ambulate up and down the stairs, as well as use of walker in office today. Decreased sodium levels as well as swelling in the lower extremities -  he has been following up with Cardiology. He is taking Lisinopril. \par \par

## 2023-02-15 NOTE — DISCUSSION/SUMMARY
[de-identified] : Physical therapy prescription was renewed as this is beneficial in terms of gradual symptom improvement. He will continue to work on lower extremity strengthening, core muscle strengthening, balance training, and gait training. I stop was completed and medications were renewed. We’ll see him for follow up in four weeks. He will follow up with vascular as previously directed. Renewed presription for oxycodone. Prescribed Kelfex for right lower extremity cellulitis.\par \par Prior to appointment and during encounter with patient extensive medical records were reviewed including but not limited to, hospital records, outpatient records, imaging results, and lab data.During this appointment the patient was examined, diagnoses were discussed and explained in a face to face manner. In addition extensive time was spent reviewing aforementioned diagnostic studies. Counseling including abnormal image results, differential diagnoses, treatment options, risk and benefits, lifestyle changes, current condition, and current medications was performed. Patient's comments, questions, and concerns were addressed and patient verbalized understanding. Based on this patient's presentation at our office, which is an orthopedic spine surgeon's office, this patient inherently / intrinsically has a risk, however minute, of developing issues such as Cauda equina syndrome, bowel and bladder changes, or progression of motor or neurological deficits such as paralysis which may be permanent.\par \par CARISSA VALLADARES Acting as a Scribe for Dr. Bibi TRAN, Carissa Valladares, attest that this documentation has been prepared under the direction and in the presence of Provider Jonas Berry MD.

## 2023-03-17 ENCOUNTER — APPOINTMENT (OUTPATIENT)
Dept: ORTHOPEDIC SURGERY | Facility: CLINIC | Age: 58
End: 2023-03-17
Payer: COMMERCIAL

## 2023-03-17 VITALS — WEIGHT: 315 LBS | HEIGHT: 74 IN | BODY MASS INDEX: 40.43 KG/M2

## 2023-03-17 PROCEDURE — 99213 OFFICE O/P EST LOW 20 MIN: CPT

## 2023-03-19 NOTE — HISTORY OF PRESENT ILLNESS
[de-identified] : 03/17/2023 - Patient returns to the office for follow. Last seen approximate one month ago. Continues with bilateral lower extremity weakness. He continues to walk with a wheeled walker. Lower extremity vascular disease partially improved. He has seen the Edema clinic, waiting for appointment.  Pt is using a walker outside, and a cane at home.  Walking on uneven ground is difficult.  Pain with standing for long periods of time.  Pain localized to the lower back.  Gabapentin has been helpful.  Stopped gabapentin for 2-3 days and pain became very intense.  Pt C/O intermittent pain and giving way in the LT knee.  \par Pain:    At Rest: 7/10 \par With Activity:  9/10 \par Treatment: no\par Additional Information: accepted to lymphedema clinic\par \par 02/15/2023 - Patient returns to the office for follow. Last seen approximate one month ago. Continues with bilateral lower extremity weakness. He continues to walk with a wheeled walker. Lower extremity vascular disease essentially unchanged. He has seen the Edema clinic recently for consultation. He has begun to develop weeping in the right shin today.\par Pain:    At Rest: 8/10 \par With Activity:  9/10 \par Treatment: physical therapy\par Additional Information: accepted to lymphedema clinic\par \par 01/18/2023 - 56 y/o male presenting for a f/u lumbar spine. Currently enrolled in PT, which is helpful. He is gradually moving in the right direction, especially with his mobility and balance. He continues to ambulate with use of the walker. His chief complaint is b/l legs locking up, specifically while standing up. current medication regimen is helpful for symptom control. General health is good. Recent DOPLLER Exam and other vascular studies performed, he is scheduled to review the results. The atrophy and swelling in the right calf is improving with time. \par \par 12/14/2022 -The patient is a 57 year male  who presents today follow up low back. Patient returns to the office for routine follow up. Lasting approximately one month ago. He has followed up with vascular and has multiple studies pending to evaluate lower extremity vascular function, including Doppler and CÉSAR.\par \par He request renewal of medication‘s in terms of gabapentin, narcotics and anti-inflammatory. He continues to walk with a walker. He’s making progress with lower extremity wound healing and is no longer taking antibiotics.\par \par Pain:    At Rest: 7/10 \par With Activity:  9/10 \par \par 11/16/2022 - 56 y/o male presenting in follow up. Continues to have edema in the lower extremities. Following up with vascular specialist, infection was ruled out. Continues to ambulate with walker. States 20 lb antwon loss. Blood sugar levels are controlled. Currently in physical therapy and completing home exercise rehab. Balance is slowly improving.  Strength in the BLE slowly improving with time. Numbness in the right leg > left leg. Pain level remains the same. Continues to have episodic sciatic pain in the right thigh, worse while ambulating. Using Neurontin for symptom control. No changes in bowel/bladder function. Oxycodone, Meloxicam, and Gabapentin helpful for symptom control. \par \par 10/19/2022 - Patient presents in follow up. Recent flare up of cellulitis rt lower extremity, he has been taking antibiotics. Patients rt lower extremity is red/pink in office today. Inconsistency in physical therapy due to cellulitis flare up, but he is eager to return. Patient has been routinely incorporating exercises from PT into his daily routine. Denies sciatic pain, but reports posterior thigh pain. Patient is ambulating in office today with walker. C/o numbness in the rt foot, as well as weakness in the RLE. \par \par 09/21/2022: The patient is a 57 year male  who presents today follow up for low back pain\par Date of Injury/Onset:\par Pain:    At Rest: 7/10 \par With Activity:  9/10 \par Mechanism of injury: \par Quality of symptoms: \par Improves with: \par Worse with: _\par Treatment/Imaging/Studies Since Last Visit: physical therapy\par Reports Available For Review Today: _\par Change since last visit: \par Additional Information: None\par \par Patient has been attending Pt - which have been slowly improving his symptoms. He is using a cane to ambulate up and down the stairs, as well as use of walker in office today. Decreased sodium levels as well as swelling in the lower extremities -  he has been following up with Cardiology. He is taking Lisinopril. \par \par

## 2023-03-19 NOTE — DISCUSSION/SUMMARY
[de-identified] : Follow up with treatment for LE vascular treatment\par call for physical therapy Rx\par Recommend neoprene knee brace.  Dont get a brace that is too tight\par Continue gabapentin\par Renew Rx Meloxicam and Oxycodone\par \par Prior to appointment and during encounter with patient extensive medical records were reviewed including but not limited to, hospital records, outpatient records, imaging results, and lab data.During this appointment the patient was examined, diagnoses were discussed and explained in a face to face manner. In addition extensive time was spent reviewing aforementioned diagnostic studies. Counseling including abnormal image results, differential diagnoses, treatment options, risk and benefits, lifestyle changes, current condition, and current medications was performed. Patient's comments, questions, and concerns were addressed and patient verbalized understanding. Based on this patient's presentation at our office, which is an orthopedic spine surgeon's office, this patient inherently / intrinsically has a risk, however minute, of developing issues such as Cauda equina syndrome, bowel and bladder changes, or progression of motor or neurological deficits such as paralysis which may be permanent.\par \par \par Herve De Souza Acting as a Scribe for Dr. Berry\karlene TRAN, Herve De Souza, attest that this documentation has been prepared under the direction and in the presence of Provider Jonas Berry MD. \par

## 2023-03-19 NOTE — PHYSICAL EXAM
[Normal Coordination] : normal coordination [Normal DTR UE/LE] : normal DTR UE/LE  [Normal Sensation] : normal sensation [Normal Mood and Affect] : normal mood and affect [Orientated] : orientated [Able to Communicate] : able to communicate [Normal Skin] : normal skin [No Rash] : no rash [No Ulcers] : no ulcers [No Lesions] : no lesions [No obvious lymphadenopathy in areas examined] : no obvious lymphadenopathy in areas examined [Well Developed] : well developed [No Respiratory Distress] : no respiratory distress [Extension] : extension [4___] : right quadriceps 4[unfilled]/5 [Left lower extremity below knee] : left lower extremity below knee [Left lower extremity above knee] : left lower extremity above knee [Right lower extremity below knee] : right lower extremity below knee [Right lower extremity above knee] : right lower extremity above knee [] : mildly antalgic [de-identified] : improving bilateral le edema

## 2023-04-19 ENCOUNTER — APPOINTMENT (OUTPATIENT)
Dept: ORTHOPEDIC SURGERY | Facility: CLINIC | Age: 58
End: 2023-04-19
Payer: COMMERCIAL

## 2023-04-19 PROCEDURE — 99213 OFFICE O/P EST LOW 20 MIN: CPT

## 2023-04-19 RX ORDER — OXYCODONE 10 MG/1
10 TABLET ORAL 4 TIMES DAILY
Qty: 120 | Refills: 0 | Status: ACTIVE | COMMUNITY
Start: 2023-04-19 | End: 1900-01-01

## 2023-04-23 NOTE — DISCUSSION/SUMMARY
[de-identified] : Discussed medical mgmt, prescriptions renewed. Discussed exercise based rehabilitation, continue home program. Discussed restarting formal PT after edema in lower extremities better controlled.

## 2023-04-23 NOTE — PHYSICAL EXAM
[Normal Coordination] : normal coordination [Normal DTR UE/LE] : normal DTR UE/LE  [Normal Sensation] : normal sensation [Normal Mood and Affect] : normal mood and affect [Orientated] : orientated [Able to Communicate] : able to communicate [Normal Skin] : normal skin [No Rash] : no rash [No Ulcers] : no ulcers [No Lesions] : no lesions [No obvious lymphadenopathy in areas examined] : no obvious lymphadenopathy in areas examined [Well Developed] : well developed [No Respiratory Distress] : no respiratory distress [Extension] : extension [4___] : right quadriceps 4[unfilled]/5 [Left lower extremity below knee] : left lower extremity below knee [Left lower extremity above knee] : left lower extremity above knee [Right lower extremity below knee] : right lower extremity below knee [Right lower extremity above knee] : right lower extremity above knee [] : mildly antalgic [de-identified] : improving bilateral le edema

## 2023-04-23 NOTE — HISTORY OF PRESENT ILLNESS
[de-identified] : \par 4/19/2023 -  Patient returns to the office for follow up. Reports improving edema in bilateral lower extremities since beginning treatments in lymphedema clinic. Continues home exercise rehabilitation and physician supervised medical mgmt.\par \par \par Pain level @ rest - 8/10\par Pain w/ activity: 10-10\par \par 03/17/2023 - Patient returns to the office for follow. Last seen approximate one month ago. Continues with bilateral lower extremity weakness. He continues to walk with a wheeled walker. Lower extremity vascular disease partially improved. He has seen the Edema clinic, waiting for appointment.  Pt is using a walker outside, and a cane at home.  Walking on uneven ground is difficult.  Pain with standing for long periods of time.  Pain localized to the lower back.  Gabapentin has been helpful.  Stopped gabapentin for 2-3 days and pain became very intense.  Pt C/O intermittent pain and giving way in the LT knee.  \par Pain:    At Rest: 7/10 \par With Activity:  9/10 \par Treatment: no\par Additional Information: accepted to lymphedema clinic\par \par 02/15/2023 - Patient returns to the office for follow. Last seen approximate one month ago. Continues with bilateral lower extremity weakness. He continues to walk with a wheeled walker. Lower extremity vascular disease essentially unchanged. He has seen the Edema clinic recently for consultation. He has begun to develop weeping in the right shin today.\par Pain:    At Rest: 8/10 \par With Activity:  9/10 \par Treatment: physical therapy\par Additional Information: accepted to lymphedema clinic\par \par 01/18/2023 - 58 y/o male presenting for a f/u lumbar spine. Currently enrolled in PT, which is helpful. He is gradually moving in the right direction, especially with his mobility and balance. He continues to ambulate with use of the walker. His chief complaint is b/l legs locking up, specifically while standing up. current medication regimen is helpful for symptom control. General health is good. Recent DOPLLER Exam and other vascular studies performed, he is scheduled to review the results. The atrophy and swelling in the right calf is improving with time. \par \par 12/14/2022 -The patient is a 57 year male  who presents today follow up low back. Patient returns to the office for routine follow up. Lasting approximately one month ago. He has followed up with vascular and has multiple studies pending to evaluate lower extremity vascular function, including Doppler and CÉSAR.\par \par He request renewal of medication‘s in terms of gabapentin, narcotics and anti-inflammatory. He continues to walk with a walker. He’s making progress with lower extremity wound healing and is no longer taking antibiotics.\par \par Pain:    At Rest: 7/10 \par With Activity:  9/10 \par \par 11/16/2022 - 58 y/o male presenting in follow up. Continues to have edema in the lower extremities. Following up with vascular specialist, infection was ruled out. Continues to ambulate with walker. States 20 lb antwon loss. Blood sugar levels are controlled. Currently in physical therapy and completing home exercise rehab. Balance is slowly improving.  Strength in the BLE slowly improving with time. Numbness in the right leg > left leg. Pain level remains the same. Continues to have episodic sciatic pain in the right thigh, worse while ambulating. Using Neurontin for symptom control. No changes in bowel/bladder function. Oxycodone, Meloxicam, and Gabapentin helpful for symptom control. \par \par 10/19/2022 - Patient presents in follow up. Recent flare up of cellulitis rt lower extremity, he has been taking antibiotics. Patients rt lower extremity is red/pink in office today. Inconsistency in physical therapy due to cellulitis flare up, but he is eager to return. Patient has been routinely incorporating exercises from PT into his daily routine. Denies sciatic pain, but reports posterior thigh pain. Patient is ambulating in office today with walker. C/o numbness in the rt foot, as well as weakness in the RLE. \par \par 09/21/2022: The patient is a 57 year male  who presents today follow up for low back pain\par Date of Injury/Onset:\par Pain:    At Rest: 7/10 \par With Activity:  9/10 \par Mechanism of injury: \par Quality of symptoms: \par Improves with: \par Worse with: _\par Treatment/Imaging/Studies Since Last Visit: physical therapy\par Reports Available For Review Today: _\par Change since last visit: \par Additional Information: None\par \par Patient has been attending Pt - which have been slowly improving his symptoms. He is using a cane to ambulate up and down the stairs, as well as use of walker in office today. Decreased sodium levels as well as swelling in the lower extremities -  he has been following up with Cardiology. He is taking Lisinopril. \par \par

## 2023-05-17 ENCOUNTER — APPOINTMENT (OUTPATIENT)
Dept: ORTHOPEDIC SURGERY | Facility: CLINIC | Age: 58
End: 2023-05-17
Payer: COMMERCIAL

## 2023-05-17 VITALS — BODY MASS INDEX: 40.43 KG/M2 | WEIGHT: 315 LBS | HEIGHT: 74 IN

## 2023-05-17 PROCEDURE — 99213 OFFICE O/P EST LOW 20 MIN: CPT

## 2023-05-17 RX ORDER — OXYCODONE 10 MG/1
10 TABLET ORAL 4 TIMES DAILY
Qty: 120 | Refills: 0 | Status: ACTIVE | COMMUNITY
Start: 2023-05-17 | End: 1900-01-01

## 2023-05-20 NOTE — DISCUSSION/SUMMARY
[de-identified] : Discussed medical mgmt, prescriptions renewed. Discussed exercise based rehabilitation, continue home program. He was provided with a referral for formal out patient PT, emphasis on gait and balance training. \par \par Prior to appointment and during encounter with patient extensive medical records were reviewed including but not limited to, hospital records, outpatient records, imaging results, and lab data.During this appointment the patient was examined, diagnoses were discussed and explained in a face to face manner. In addition extensive time was spent reviewing aforementioned diagnostic studies. Counseling including abnormal image results, differential diagnoses, treatment options, risk and benefits, lifestyle changes, current condition, and current medications was performed. Patient's comments, questions, and concerns were addressed and patient verbalized understanding. Based on this patient's presentation at our office, which is an orthopedic spine surgeon's office, this patient inherently / intrinsically has a risk, however minute, of developing issues such as Cauda equina syndrome, bowel and bladder changes, or progression of motor or neurological deficits such as paralysis which may be permanent.\par \par CARISSA VALLADARES Acting as a Scribe for Dr. Bibi TRAN, Carissa Valladares, attest that this documentation has been prepared under the direction and in the presence of Provider Jonas Berry MD.

## 2023-05-20 NOTE — PHYSICAL EXAM
[Normal Coordination] : normal coordination [Normal DTR UE/LE] : normal DTR UE/LE  [Normal Sensation] : normal sensation [Normal Mood and Affect] : normal mood and affect [Orientated] : orientated [Able to Communicate] : able to communicate [Normal Skin] : normal skin [No Rash] : no rash [No Ulcers] : no ulcers [No Lesions] : no lesions [No obvious lymphadenopathy in areas examined] : no obvious lymphadenopathy in areas examined [Well Developed] : well developed [No Respiratory Distress] : no respiratory distress [Extension] : extension [4___] : right quadriceps 4[unfilled]/5 [Left lower extremity below knee] : left lower extremity below knee [Left lower extremity above knee] : left lower extremity above knee [Right lower extremity below knee] : right lower extremity below knee [Right lower extremity above knee] : right lower extremity above knee [] : mildly antalgic [de-identified] : improving bilateral le edema

## 2023-05-20 NOTE — HISTORY OF PRESENT ILLNESS
[de-identified] : 5/17/23: Patient presenting in follow up. No falls since his last visit. Has transitioned to the cane, tolerating it okay. Balance is gradually improving. Edema in the legs is gradually improving. completed Edema clinic treatment. Weight loss of approx 30 lbs via weight watchers program. \par \par 4/19/2023 -  Patient returns to the office for follow up. Reports improving edema in bilateral lower extremities since beginning treatments in lymphedema clinic. Continues home exercise rehabilitation and physician supervised medical mgmt.\par Pain level @ rest - 8/10\par Pain w/ activity: 10-10\par \par 03/17/2023 - Patient returns to the office for follow. Last seen approximate one month ago. Continues with bilateral lower extremity weakness. He continues to walk with a wheeled walker. Lower extremity vascular disease partially improved. He has seen the Edema clinic, waiting for appointment.  Pt is using a walker outside, and a cane at home.  Walking on uneven ground is difficult.  Pain with standing for long periods of time.  Pain localized to the lower back.  Gabapentin has been helpful.  Stopped gabapentin for 2-3 days and pain became very intense.  Pt C/O intermittent pain and giving way in the LT knee.  \par Pain:    At Rest: 7/10 \par With Activity:  9/10 \par Treatment: no\par Additional Information: accepted to lymphedema clinic\par \par 02/15/2023 - Patient returns to the office for follow. Last seen approximate one month ago. Continues with bilateral lower extremity weakness. He continues to walk with a wheeled walker. Lower extremity vascular disease essentially unchanged. He has seen the Edema clinic recently for consultation. He has begun to develop weeping in the right shin today.\par Pain:    At Rest: 8/10 \par With Activity:  9/10 \par Treatment: physical therapy\par Additional Information: accepted to lymphedema clinic\par \par 01/18/2023 - 56 y/o male presenting for a f/u lumbar spine. Currently enrolled in PT, which is helpful. He is gradually moving in the right direction, especially with his mobility and balance. He continues to ambulate with use of the walker. His chief complaint is b/l legs locking up, specifically while standing up. current medication regimen is helpful for symptom control. General health is good. Recent DOPLLER Exam and other vascular studies performed, he is scheduled to review the results. The atrophy and swelling in the right calf is improving with time. \par \par 12/14/2022 -The patient is a 57 year male  who presents today follow up low back. Patient returns to the office for routine follow up. Lasting approximately one month ago. He has followed up with vascular and has multiple studies pending to evaluate lower extremity vascular function, including Doppler and CÉSAR.\par \par He request renewal of medication‘s in terms of gabapentin, narcotics and anti-inflammatory. He continues to walk with a walker. He’s making progress with lower extremity wound healing and is no longer taking antibiotics.\par \par Pain:    At Rest: 7/10 \par With Activity:  9/10 \par \par 11/16/2022 - 56 y/o male presenting in follow up. Continues to have edema in the lower extremities. Following up with vascular specialist, infection was ruled out. Continues to ambulate with walker. States 20 lb antwon loss. Blood sugar levels are controlled. Currently in physical therapy and completing home exercise rehab. Balance is slowly improving.  Strength in the BLE slowly improving with time. Numbness in the right leg > left leg. Pain level remains the same. Continues to have episodic sciatic pain in the right thigh, worse while ambulating. Using Neurontin for symptom control. No changes in bowel/bladder function. Oxycodone, Meloxicam, and Gabapentin helpful for symptom control. \par \par 10/19/2022 - Patient presents in follow up. Recent flare up of cellulitis rt lower extremity, he has been taking antibiotics. Patients rt lower extremity is red/pink in office today. Inconsistency in physical therapy due to cellulitis flare up, but he is eager to return. Patient has been routinely incorporating exercises from PT into his daily routine. Denies sciatic pain, but reports posterior thigh pain. Patient is ambulating in office today with walker. C/o numbness in the rt foot, as well as weakness in the RLE. \par \par 09/21/2022: The patient is a 57 year male  who presents today follow up for low back pain\par Date of Injury/Onset:\par Pain:    At Rest: 7/10 \par With Activity:  9/10 \par Mechanism of injury: \par Quality of symptoms: \par Improves with: \par Worse with: _\par Treatment/Imaging/Studies Since Last Visit: physical therapy\par Reports Available For Review Today: _\par Change since last visit: \par Additional Information: None\par \par Patient has been attending Pt - which have been slowly improving his symptoms. He is using a cane to ambulate up and down the stairs, as well as use of walker in office today. Decreased sodium levels as well as swelling in the lower extremities -  he has been following up with Cardiology. He is taking Lisinopril. \par \par

## 2023-06-14 ENCOUNTER — APPOINTMENT (OUTPATIENT)
Dept: ORTHOPEDIC SURGERY | Facility: CLINIC | Age: 58
End: 2023-06-14
Payer: COMMERCIAL

## 2023-06-14 VITALS — HEIGHT: 74 IN | BODY MASS INDEX: 40.43 KG/M2 | WEIGHT: 315 LBS

## 2023-06-14 PROCEDURE — 99213 OFFICE O/P EST LOW 20 MIN: CPT

## 2023-06-18 NOTE — PHYSICAL EXAM
[Normal Coordination] : normal coordination [Normal DTR UE/LE] : normal DTR UE/LE  [Normal Sensation] : normal sensation [Normal Mood and Affect] : normal mood and affect [Orientated] : orientated [Able to Communicate] : able to communicate [Normal Skin] : normal skin [No Rash] : no rash [No Ulcers] : no ulcers [No Lesions] : no lesions [No obvious lymphadenopathy in areas examined] : no obvious lymphadenopathy in areas examined [Well Developed] : well developed [No Respiratory Distress] : no respiratory distress [Extension] : extension [4___] : right quadriceps 4[unfilled]/5 [Left lower extremity below knee] : left lower extremity below knee [Left lower extremity above knee] : left lower extremity above knee [Right lower extremity below knee] : right lower extremity below knee [Right lower extremity above knee] : right lower extremity above knee [] : ambulation with cane [de-identified] : improving bilateral le edema

## 2023-06-18 NOTE — HISTORY OF PRESENT ILLNESS
[de-identified] : 06/14/2023 - Patient presenting for a follow up. Strength in the lower extremities remains stable. Reports while stepping on Rt leg, reproduced focal pain in the RT thigh. He is weight bearing okay. Remains cautious while ambulating, no falls. Using cane. LE edema is gradually improving. Actively competing exercises from PT at home. Medication regimen with Meloxicam, Gabapentin, and oxycodone is helpful for symptom control. \par \par 5/17/23: Patient presenting in follow up. No falls since his last visit. Has transitioned to the cane, tolerating it okay. Balance is gradually improving. Edema in the legs is gradually improving. completed Edema clinic treatment. Weight loss of approx 30 lbs via weight watchers program. \par \par 4/19/2023 -  Patient returns to the office for follow up. Reports improving edema in bilateral lower extremities since beginning treatments in lymphedema clinic. Continues home exercise rehabilitation and physician supervised medical mgmt.\par Pain level @ rest - 8/10\par Pain w/ activity: 10-10\par \par 03/17/2023 - Patient returns to the office for follow. Last seen approximate one month ago. Continues with bilateral lower extremity weakness. He continues to walk with a wheeled walker. Lower extremity vascular disease partially improved. He has seen the Edema clinic, waiting for appointment.  Pt is using a walker outside, and a cane at home.  Walking on uneven ground is difficult.  Pain with standing for long periods of time.  Pain localized to the lower back.  Gabapentin has been helpful.  Stopped gabapentin for 2-3 days and pain became very intense.  Pt C/O intermittent pain and giving way in the LT knee.  \par Pain:    At Rest: 7/10 \par With Activity:  9/10 \par Treatment: no\par Additional Information: accepted to lymphedema clinic\par \par 02/15/2023 - Patient returns to the office for follow. Last seen approximate one month ago. Continues with bilateral lower extremity weakness. He continues to walk with a wheeled walker. Lower extremity vascular disease essentially unchanged. He has seen the Edema clinic recently for consultation. He has begun to develop weeping in the right shin today.\par Pain:    At Rest: 8/10 \par With Activity:  9/10 \par Treatment: physical therapy\par Additional Information: accepted to lymphedema clinic\par \par 01/18/2023 - 56 y/o male presenting for a f/u lumbar spine. Currently enrolled in PT, which is helpful. He is gradually moving in the right direction, especially with his mobility and balance. He continues to ambulate with use of the walker. His chief complaint is b/l legs locking up, specifically while standing up. current medication regimen is helpful for symptom control. General health is good. Recent DOPLLER Exam and other vascular studies performed, he is scheduled to review the results. The atrophy and swelling in the right calf is improving with time. \par \par 12/14/2022 -The patient is a 57 year male  who presents today follow up low back. Patient returns to the office for routine follow up. Lasting approximately one month ago. He has followed up with vascular and has multiple studies pending to evaluate lower extremity vascular function, including Doppler and CÉSAR.\par \par He request renewal of medication‘s in terms of gabapentin, narcotics and anti-inflammatory. He continues to walk with a walker. He’s making progress with lower extremity wound healing and is no longer taking antibiotics.\par \par Pain:    At Rest: 7/10 \par With Activity:  9/10 \par \par 11/16/2022 - 56 y/o male presenting in follow up. Continues to have edema in the lower extremities. Following up with vascular specialist, infection was ruled out. Continues to ambulate with walker. States 20 lb antwon loss. Blood sugar levels are controlled. Currently in physical therapy and completing home exercise rehab. Balance is slowly improving.  Strength in the BLE slowly improving with time. Numbness in the right leg > left leg. Pain level remains the same. Continues to have episodic sciatic pain in the right thigh, worse while ambulating. Using Neurontin for symptom control. No changes in bowel/bladder function. Oxycodone, Meloxicam, and Gabapentin helpful for symptom control. \par \par 10/19/2022 - Patient presents in follow up. Recent flare up of cellulitis rt lower extremity, he has been taking antibiotics. Patients rt lower extremity is red/pink in office today. Inconsistency in physical therapy due to cellulitis flare up, but he is eager to return. Patient has been routinely incorporating exercises from PT into his daily routine. Denies sciatic pain, but reports posterior thigh pain. Patient is ambulating in office today with walker. C/o numbness in the rt foot, as well as weakness in the RLE. \par \par 09/21/2022: The patient is a 57 year male  who presents today follow up for low back pain\par Date of Injury/Onset:\par Pain:    At Rest: 7/10 \par With Activity:  9/10 \par Mechanism of injury: \par Quality of symptoms: \par Improves with: \par Worse with: _\par Treatment/Imaging/Studies Since Last Visit: physical therapy\par Reports Available For Review Today: _\par Change since last visit: \par Additional Information: None\par \par Patient has been attending Pt - which have been slowly improving his symptoms. He is using a cane to ambulate up and down the stairs, as well as use of walker in office today. Decreased sodium levels as well as swelling in the lower extremities -  he has been following up with Cardiology. He is taking Lisinopril. \par \par

## 2023-06-18 NOTE — DISCUSSION/SUMMARY
[de-identified] : Prescriptions renewed. Overall he seems to be trending in a less symptomatic direction with prescribed treatments. Discussed exercise based rehabilitation, continue home program. He was provided with a renewal for out patient PT, emphasis on gait and balance training and lower extremity strengthening.  F/U 4/6 WKS. \par \par Prior to appointment and during encounter with patient extensive medical records were reviewed including but not limited to, hospital records, outpatient records, imaging results, and lab data.During this appointment the patient was examined, diagnoses were discussed and explained in a face to face manner. In addition extensive time was spent reviewing aforementioned diagnostic studies. Counseling including abnormal image results, differential diagnoses, treatment options, risk and benefits, lifestyle changes, current condition, and current medications was performed. Patient's comments, questions, and concerns were addressed and patient verbalized understanding. Based on this patient's presentation at our office, which is an orthopedic spine surgeon's office, this patient inherently / intrinsically has a risk, however minute, of developing issues such as Cauda equina syndrome, bowel and bladder changes, or progression of motor or neurological deficits such as paralysis which may be permanent.\par \par CARISSA VALLADARES Acting as a Scribe for Dr. Bibi TRAN, Carissa Valladares, attest that this documentation has been prepared under the direction and in the presence of Provider Jonas Berry MD.

## 2023-07-12 ENCOUNTER — FORM ENCOUNTER (OUTPATIENT)
Age: 58
End: 2023-07-12

## 2023-07-13 RX ORDER — OXYCODONE 10 MG/1
10 TABLET ORAL 4 TIMES DAILY
Qty: 120 | Refills: 0 | Status: ACTIVE | COMMUNITY
Start: 2023-06-14 | End: 1900-01-01

## 2023-08-16 ENCOUNTER — APPOINTMENT (OUTPATIENT)
Dept: ORTHOPEDIC SURGERY | Facility: CLINIC | Age: 58
End: 2023-08-16
Payer: COMMERCIAL

## 2023-08-16 VITALS — BODY MASS INDEX: 40.43 KG/M2 | HEIGHT: 74 IN | WEIGHT: 315 LBS

## 2023-08-16 DIAGNOSIS — L03.115 CELLULITIS OF RIGHT LOWER LIMB: ICD-10-CM

## 2023-08-16 PROCEDURE — 99214 OFFICE O/P EST MOD 30 MIN: CPT

## 2023-08-16 RX ORDER — OXYCODONE 10 MG/1
10 TABLET ORAL 4 TIMES DAILY
Qty: 120 | Refills: 0 | Status: ACTIVE | COMMUNITY
Start: 2023-08-16 | End: 1900-01-01

## 2023-08-20 NOTE — PHYSICAL EXAM
[Normal Coordination] : normal coordination [Normal DTR UE/LE] : normal DTR UE/LE  [Normal Sensation] : normal sensation [Normal Mood and Affect] : normal mood and affect [Orientated] : orientated [Able to Communicate] : able to communicate [Normal Skin] : normal skin [No Rash] : no rash [No Ulcers] : no ulcers [No Lesions] : no lesions [No obvious lymphadenopathy in areas examined] : no obvious lymphadenopathy in areas examined [Well Developed] : well developed [No Respiratory Distress] : no respiratory distress [Extension] : extension [4___] : right quadriceps 4[unfilled]/5 [Left lower extremity below knee] : left lower extremity below knee [Left lower extremity above knee] : left lower extremity above knee [Right lower extremity below knee] : right lower extremity below knee [Right lower extremity above knee] : right lower extremity above knee [] : ambulation with cane [de-identified] : significant bilateral le edema

## 2023-08-20 NOTE — HISTORY OF PRESENT ILLNESS
[de-identified] : 08/16/2023 - Patient presenting for a follow up. There is flared up of lower extremity edema a few wks ago, he is returning to lymphedema clinic next week. Ambulating with use of the cane. He is taking diuretic. Back pain remains the same.  Strength in the lower extremities remains stable. Increased instability, few falls. Returned to physical therapy, which was overall helpful, he is eager to continue. Taking Neurontin, meloxicam, and oxycodone for symptom control.  06/14/2023 - Patient presenting for a follow up. Strength in the lower extremities remains stable. Reports while stepping on Rt leg, reproduced focal pain in the RT thigh. He is weight bearing okay. Remains cautious while ambulating, no falls. Using cane. LE edema is gradually improving. Actively competing exercises from PT at home. Medication regimen with Meloxicam, Gabapentin, and oxycodone is helpful for symptom control.   5/17/23: Patient presenting in follow up. No falls since his last visit. Has transitioned to the cane, tolerating it okay. Balance is gradually improving. Edema in the legs is gradually improving. completed Edema clinic treatment. Weight loss of approx 30 lbs via weight watchers program.   4/19/2023 -  Patient returns to the office for follow up. Reports improving edema in bilateral lower extremities since beginning treatments in lymphedema clinic. Continues home exercise rehabilitation and physician supervised medical mgmt. Pain level @ rest - 8/10 Pain w/ activity: 10-10  03/17/2023 - Patient returns to the office for follow. Last seen approximate one month ago. Continues with bilateral lower extremity weakness. He continues to walk with a wheeled walker. Lower extremity vascular disease partially improved. He has seen the Edema clinic, waiting for appointment.  Pt is using a walker outside, and a cane at home.  Walking on uneven ground is difficult.  Pain with standing for long periods of time.  Pain localized to the lower back.  Gabapentin has been helpful.  Stopped gabapentin for 2-3 days and pain became very intense.  Pt C/O intermittent pain and giving way in the LT knee.   Pain:    At Rest: 7/10  With Activity:  9/10  Treatment: no Additional Information: accepted to lymphedema clinic  02/15/2023 - Patient returns to the office for follow. Last seen approximate one month ago. Continues with bilateral lower extremity weakness. He continues to walk with a wheeled walker. Lower extremity vascular disease essentially unchanged. He has seen the Edema clinic recently for consultation. He has begun to develop weeping in the right shin today. Pain:    At Rest: 8/10  With Activity:  9/10  Treatment: physical therapy Additional Information: accepted to lymphedema clinic  01/18/2023 - 56 y/o male presenting for a f/u lumbar spine. Currently enrolled in PT, which is helpful. He is gradually moving in the right direction, especially with his mobility and balance. He continues to ambulate with use of the walker. His chief complaint is b/l legs locking up, specifically while standing up. current medication regimen is helpful for symptom control. General health is good. Recent DOPLLER Exam and other vascular studies performed, he is scheduled to review the results. The atrophy and swelling in the right calf is improving with time.   12/14/2022 -The patient is a 57 year male  who presents today follow up low back. Patient returns to the office for routine follow up. Lasting approximately one month ago. He has followed up with vascular and has multiple studies pending to evaluate lower extremity vascular function, including Doppler and CÉSAR.  He request renewal of medication's in terms of gabapentin, narcotics and anti-inflammatory. He continues to walk with a walker. He's making progress with lower extremity wound healing and is no longer taking antibiotics.  Pain:    At Rest: 7/10  With Activity:  9/10   11/16/2022 - 56 y/o male presenting in follow up. Continues to have edema in the lower extremities. Following up with vascular specialist, infection was ruled out. Continues to ambulate with walker. States 20 lb atnwon loss. Blood sugar levels are controlled. Currently in physical therapy and completing home exercise rehab. Balance is slowly improving.  Strength in the BLE slowly improving with time. Numbness in the right leg > left leg. Pain level remains the same. Continues to have episodic sciatic pain in the right thigh, worse while ambulating. Using Neurontin for symptom control. No changes in bowel/bladder function. Oxycodone, Meloxicam, and Gabapentin helpful for symptom control.   10/19/2022 - Patient presents in follow up. Recent flare up of cellulitis rt lower extremity, he has been taking antibiotics. Patients rt lower extremity is red/pink in office today. Inconsistency in physical therapy due to cellulitis flare up, but he is eager to return. Patient has been routinely incorporating exercises from PT into his daily routine. Denies sciatic pain, but reports posterior thigh pain. Patient is ambulating in office today with walker. C/o numbness in the rt foot, as well as weakness in the RLE.   09/21/2022: The patient is a 57 year male  who presents today follow up for low back pain Date of Injury/Onset: Pain:    At Rest: 7/10  With Activity:  9/10  Mechanism of injury:  Quality of symptoms:  Improves with:  Worse with: _ Treatment/Imaging/Studies Since Last Visit: physical therapy Reports Available For Review Today: _ Change since last visit:  Additional Information: None  Patient has been attending Pt - which have been slowly improving his symptoms. He is using a cane to ambulate up and down the stairs, as well as use of walker in office today. Decreased sodium levels as well as swelling in the lower extremities -  he has been following up with Cardiology. He is taking Lisinopril.

## 2023-08-20 NOTE — DATA REVIEWED
[Outside X-rays] : outside x-rays [Lumbar Spine] : lumbar spine [I independently reviewed and interpreted images and report] : I independently reviewed and interpreted images and report [FreeTextEntry1] : I stop paperwork reviewed

## 2023-09-13 ENCOUNTER — APPOINTMENT (OUTPATIENT)
Dept: ORTHOPEDIC SURGERY | Facility: CLINIC | Age: 58
End: 2023-09-13
Payer: COMMERCIAL

## 2023-09-13 VITALS — WEIGHT: 315 LBS | BODY MASS INDEX: 40.43 KG/M2 | HEIGHT: 74 IN

## 2023-09-13 PROCEDURE — 99214 OFFICE O/P EST MOD 30 MIN: CPT

## 2023-09-13 RX ORDER — OXYCODONE 10 MG/1
10 TABLET ORAL 4 TIMES DAILY
Qty: 120 | Refills: 0 | Status: ACTIVE | COMMUNITY
Start: 2023-09-13 | End: 1900-01-01

## 2023-10-11 ENCOUNTER — APPOINTMENT (OUTPATIENT)
Dept: ORTHOPEDIC SURGERY | Facility: CLINIC | Age: 58
End: 2023-10-11
Payer: COMMERCIAL

## 2023-10-11 VITALS — WEIGHT: 315 LBS | HEIGHT: 74 IN | BODY MASS INDEX: 40.43 KG/M2

## 2023-10-11 PROCEDURE — 99214 OFFICE O/P EST MOD 30 MIN: CPT

## 2023-11-08 ENCOUNTER — APPOINTMENT (OUTPATIENT)
Dept: ORTHOPEDIC SURGERY | Facility: CLINIC | Age: 58
End: 2023-11-08
Payer: COMMERCIAL

## 2023-11-08 VITALS — WEIGHT: 315 LBS | BODY MASS INDEX: 40.43 KG/M2 | HEIGHT: 74 IN

## 2023-11-08 PROCEDURE — ZZZZZ: CPT

## 2023-11-08 RX ORDER — OXYCODONE 10 MG/1
10 TABLET ORAL 4 TIMES DAILY
Qty: 120 | Refills: 0 | Status: ACTIVE | COMMUNITY
Start: 2023-11-08 | End: 1900-01-01

## 2023-12-06 ENCOUNTER — APPOINTMENT (OUTPATIENT)
Dept: ORTHOPEDIC SURGERY | Facility: CLINIC | Age: 58
End: 2023-12-06
Payer: COMMERCIAL

## 2023-12-06 VITALS — BODY MASS INDEX: 40.43 KG/M2 | WEIGHT: 315 LBS | HEIGHT: 74 IN

## 2023-12-06 PROCEDURE — ZZZZZ: CPT

## 2024-01-05 ENCOUNTER — APPOINTMENT (OUTPATIENT)
Dept: ORTHOPEDIC SURGERY | Facility: CLINIC | Age: 59
End: 2024-01-05
Payer: COMMERCIAL

## 2024-01-05 VITALS — WEIGHT: 315 LBS | HEIGHT: 74 IN | BODY MASS INDEX: 40.43 KG/M2

## 2024-01-05 PROCEDURE — ZZZZZ: CPT

## 2024-01-07 NOTE — DATA REVIEWED
[FreeTextEntry1] : I stop paperwork reviewed PT progress notes reviewed Lymphedema clinic notes reviewed

## 2024-01-07 NOTE — PHYSICAL EXAM
[Normal Coordination] : normal coordination [Normal DTR UE/LE] : normal DTR UE/LE  [Normal Sensation] : normal sensation [Normal Mood and Affect] : normal mood and affect [Orientated] : orientated [Able to Communicate] : able to communicate [Normal Skin] : normal skin [No Rash] : no rash [No Ulcers] : no ulcers [No Lesions] : no lesions [No obvious lymphadenopathy in areas examined] : no obvious lymphadenopathy in areas examined [Well Developed] : well developed [No Respiratory Distress] : no respiratory distress [Extension] : extension [4___] : right quadriceps 4[unfilled]/5 [Left lower extremity below knee] : left lower extremity below knee [Left lower extremity above knee] : left lower extremity above knee [Right lower extremity below knee] : right lower extremity below knee [Right lower extremity above knee] : right lower extremity above knee [] : ambulation with cane [de-identified] : obese [de-identified] : significant bilateral le edema [de-identified] : bilateral pitting edema

## 2024-01-07 NOTE — DISCUSSION/SUMMARY
[de-identified] : Prescription renewed. Patient will continue treatment with outpatient PT as he continues to make progress, emphasis on gait and balance training and lower extremity strengthening - renewal was issued today. F/U 4/6 WKS.   Prior to appointment and during encounter with patient extensive medical records were reviewed including but not limited to, hospital records, outpatient records, imaging results, and lab data. During this appointment the patient was examined, diagnoses were discussed and explained in a face to face manner. In addition extensive time was spent reviewing aforementioned diagnostic studies. Counseling including abnormal image results, differential diagnoses, treatment options, risk and benefits, lifestyle changes, current condition, and current medications was performed. Patient's comments, questions, and concerns were addressed and patient verbalized understanding. Based on this patient's presentation at our office, which is an orthopedic spine surgeon's office, this patient inherently / intrinsically has a risk, however minute, of developing issues such as Cauda equina syndrome, bowel and bladder changes, or progression of motor or neurological deficits such as paralysis which may be permanent.  CARISSA VALLADARES Acting as a Scribe for Dr. Jamal TRAN, Carissa Valladares, attest that this documentation has been prepared under the direction and in the presence of Provider Jonas Berry MD.

## 2024-01-07 NOTE — HISTORY OF PRESENT ILLNESS
[de-identified] : 01/05/2024 - Patient presenting in follow up. He reports severe flare up of cellulitis since he was last seen, was treated with Kephlex for 1 week and did not have to get admitted to hospital. Due to his gait, states his left knee has been locking. Otherwise, back pain and nerve like pains has been the same. Temporarily had to discontinue PT but he has returned which has been helpful. Taking meloxicam Neurontin, and oxycodone for symptom control.  12/06/2023 - Patient presenting in follow up. He remains in physical therapy which has been helpful. Overall strength and balance is improving with PT. Lymphedema has been stable. Taking meloxicam Neurontin, and oxycodone for symptom control. Ambulating with use of the cane. He complains of nerve like pains in the right thigh present while standing. Distal right leg goes numb while sitting diffusely. Left sided buttock ache, worse standing.   11/08/2023 - Pt presenting in follow up. Overall sxs are stable since he was last seen. He continues treatment with PT which has been overall helpful, balance improving as a result of PT. Lymphedema has been stable. Ambulating with use of the cane. He c/o hip pain, RLE numbness only present sitting in a chair. Taking meloxicam Neurontin, and oxycodone for symptom control.   10/11/2023 - Patient presenting for a follow up. Lymphedema is controlled, slight flare up over the last few days left side but improving. No longer in lymphedema clinic. Reports heightened left buttock pain without extending into the leg 2-3 weeks ago. Buttock pain is worse standing. He continues treatment with physical therapy and notes transient improvement in lower extremity strength. Notes set back in strength and increased unsteadiness while not enrolled in formal PT. Taking meloxicam Neurontin, and oxycodone for symptom control.   09/13/2023 - Patient presenting for a follow up. PT reinitiated treatment at the lymphedema clinic which has been significantly helpful for LE edema. He continues home stretching at home and has been enrolled in physical therapy which is helpful. He complains of low back pain, without radicular lower extremity pains. Overall severity is improved since he was last seen with prescribed treatments. Taking meloxicam Neurontin, and oxycodone for symptom control.   08/16/2023 - Patient presenting for a follow up. There is flared up of lower extremity edema a few wks ago, he is returning to lymphedema clinic next week. Ambulating with use of the cane. He is taking diuretic. Back pain remains the same.  Strength in the lower extremities remains stable. Increased instability, few falls. Returned to physical therapy, which was overall helpful, he is eager to continue. Taking Neurontin, meloxicam, and oxycodone for symptom control.  06/14/2023 - Patient presenting for a follow up. Strength in the lower extremities remains stable. Reports while stepping on Rt leg, reproduced focal pain in the RT thigh. He is weight bearing okay. Remains cautious while ambulating, no falls. Using cane. LE edema is gradually improving. Actively competing exercises from PT at home. Medication regimen with Meloxicam, Gabapentin, and oxycodone is helpful for symptom control.   5/17/23: Patient presenting in follow up. No falls since his last visit. Has transitioned to the cane, tolerating it okay. Balance is gradually improving. Edema in the legs is gradually improving. completed Edema clinic treatment. Weight loss of approx 30 lbs via weight watchers program.   4/19/2023 -  Patient returns to the office for follow up. Reports improving edema in bilateral lower extremities since beginning treatments in lymphedema clinic. Continues home exercise rehabilitation and physician supervised medical mgmt. Pain level @ rest - 8/10 Pain w/ activity: 10-10  03/17/2023 - Patient returns to the office for follow. Last seen approximate one month ago. Continues with bilateral lower extremity weakness. He continues to walk with a wheeled walker. Lower extremity vascular disease partially improved. He has seen the Edema clinic, waiting for appointment.  Pt is using a walker outside, and a cane at home.  Walking on uneven ground is difficult.  Pain with standing for long periods of time.  Pain localized to the lower back.  Gabapentin has been helpful.  Stopped gabapentin for 2-3 days and pain became very intense.  Pt C/O intermittent pain and giving way in the LT knee.   Pain:    At Rest: 7/10  With Activity:  9/10  Treatment: no Additional Information: accepted to lymphedema clinic  02/15/2023 - Patient returns to the office for follow. Last seen approximate one month ago. Continues with bilateral lower extremity weakness. He continues to walk with a wheeled walker. Lower extremity vascular disease essentially unchanged. He has seen the Edema clinic recently for consultation. He has begun to develop weeping in the right shin today. Pain:    At Rest: 8/10  With Activity:  9/10  Treatment: physical therapy Additional Information: accepted to lymphedema clinic  01/18/2023 - 58 y/o male presenting for a f/u lumbar spine. Currently enrolled in PT, which is helpful. He is gradually moving in the right direction, especially with his mobility and balance. He continues to ambulate with use of the walker. His chief complaint is b/l legs locking up, specifically while standing up. current medication regimen is helpful for symptom control. General health is good. Recent DOPLLER Exam and other vascular studies performed, he is scheduled to review the results. The atrophy and swelling in the right calf is improving with time.   12/14/2022 -The patient is a 57 year male  who presents today follow up low back. Patient returns to the office for routine follow up. Lasting approximately one month ago. He has followed up with vascular and has multiple studies pending to evaluate lower extremity vascular function, including Doppler and CÉSAR.  He request renewal of medication's in terms of gabapentin, narcotics and anti-inflammatory. He continues to walk with a walker. He's making progress with lower extremity wound healing and is no longer taking antibiotics.  Pain:    At Rest: 7/10  With Activity:  9/10   11/16/2022 - 58 y/o male presenting in follow up. Continues to have edema in the lower extremities. Following up with vascular specialist, infection was ruled out. Continues to ambulate with walker. States 20 lb antwon loss. Blood sugar levels are controlled. Currently in physical therapy and completing home exercise rehab. Balance is slowly improving.  Strength in the BLE slowly improving with time. Numbness in the right leg > left leg. Pain level remains the same. Continues to have episodic sciatic pain in the right thigh, worse while ambulating. Using Neurontin for symptom control. No changes in bowel/bladder function. Oxycodone, Meloxicam, and Gabapentin helpful for symptom control.   10/19/2022 - Patient presents in follow up. Recent flare up of cellulitis rt lower extremity, he has been taking antibiotics. Patients rt lower extremity is red/pink in office today. Inconsistency in physical therapy due to cellulitis flare up, but he is eager to return. Patient has been routinely incorporating exercises from PT into his daily routine. Denies sciatic pain, but reports posterior thigh pain. Patient is ambulating in office today with walker. C/o numbness in the rt foot, as well as weakness in the RLE.   09/21/2022: The patient is a 57 year male  who presents today follow up for low back pain Date of Injury/Onset: Pain:    At Rest: 7/10  With Activity:  9/10  Mechanism of injury:  Quality of symptoms:  Improves with:  Worse with: _ Treatment/Imaging/Studies Since Last Visit: physical therapy Reports Available For Review Today: _ Change since last visit:  Additional Information: None  Patient has been attending Pt - which have been slowly improving his symptoms. He is using a cane to ambulate up and down the stairs, as well as use of walker in office today. Decreased sodium levels as well as swelling in the lower extremities -  he has been following up with Cardiology. He is taking Lisinopril.

## 2024-02-02 ENCOUNTER — APPOINTMENT (OUTPATIENT)
Dept: ORTHOPEDIC SURGERY | Facility: CLINIC | Age: 59
End: 2024-02-02

## 2024-02-07 ENCOUNTER — APPOINTMENT (OUTPATIENT)
Dept: ORTHOPEDIC SURGERY | Facility: CLINIC | Age: 59
End: 2024-02-07
Payer: COMMERCIAL

## 2024-02-07 VITALS — WEIGHT: 315 LBS | BODY MASS INDEX: 40.43 KG/M2 | HEIGHT: 74 IN

## 2024-02-07 PROCEDURE — ZZZZZ: CPT

## 2024-02-08 NOTE — HISTORY OF PRESENT ILLNESS
[de-identified] : 02/07/2024 - Patient presenting for follow-up. He reports gashing his leg a few days after his last visit. He was treated with antibiotics, which somewhat improved his left leg, although it remains redder than baseline. He experiences an overall aching sensation in the low back, buttock, and lateral thigh. Taking meloxicam Neurontin, and oxycodone for symptom control .He believes additional physical therapy would be beneficial. Otherwise, there have been no changes to his general medical health.  01/05/2024 - Patient presenting in follow up. He reports severe flare up of cellulitis since he was last seen, was treated with Kephlex for 1 week and did not have to get admitted to hospital. Due to his gait, states his left knee has been locking. Otherwise, back pain and nerve like pains has been the same. Temporarily had to discontinue PT but he has returned which has been helpful. Taking meloxicam Neurontin, and oxycodone for symptom control.  12/06/2023 - Patient presenting in follow up. He remains in physical therapy which has been helpful. Overall strength and balance is improving with PT. Lymphedema has been stable. Taking meloxicam Neurontin, and oxycodone for symptom control. Ambulating with use of the cane. He complains of nerve like pains in the right thigh present while standing. Distal right leg goes numb while sitting diffusely. Left sided buttock ache, worse standing.   11/08/2023 - Pt presenting in follow up. Overall sxs are stable since he was last seen. He continues treatment with PT which has been overall helpful, balance improving as a result of PT. Lymphedema has been stable. Ambulating with use of the cane. He c/o hip pain, RLE numbness only present sitting in a chair. Taking meloxicam Neurontin, and oxycodone for symptom control.   10/11/2023 - Patient presenting for a follow up. Lymphedema is controlled, slight flare up over the last few days left side but improving. No longer in lymphedema clinic. Reports heightened left buttock pain without extending into the leg 2-3 weeks ago. Buttock pain is worse standing. He continues treatment with physical therapy and notes transient improvement in lower extremity strength. Notes set back in strength and increased unsteadiness while not enrolled in formal PT. Taking meloxicam Neurontin, and oxycodone for symptom control.   09/13/2023 - Patient presenting for a follow up. PT reinitiated treatment at the lymphedema clinic which has been significantly helpful for LE edema. He continues home stretching at home and has been enrolled in physical therapy which is helpful. He complains of low back pain, without radicular lower extremity pains. Overall severity is improved since he was last seen with prescribed treatments. Taking meloxicam Neurontin, and oxycodone for symptom control.   08/16/2023 - Patient presenting for a follow up. There is flared up of lower extremity edema a few wks ago, he is returning to lymphedema clinic next week. Ambulating with use of the cane. He is taking diuretic. Back pain remains the same.  Strength in the lower extremities remains stable. Increased instability, few falls. Returned to physical therapy, which was overall helpful, he is eager to continue. Taking Neurontin, meloxicam, and oxycodone for symptom control.  06/14/2023 - Patient presenting for a follow up. Strength in the lower extremities remains stable. Reports while stepping on Rt leg, reproduced focal pain in the RT thigh. He is weight bearing okay. Remains cautious while ambulating, no falls. Using cane. LE edema is gradually improving. Actively competing exercises from PT at home. Medication regimen with Meloxicam, Gabapentin, and oxycodone is helpful for symptom control.   5/17/23: Patient presenting in follow up. No falls since his last visit. Has transitioned to the cane, tolerating it okay. Balance is gradually improving. Edema in the legs is gradually improving. completed Edema clinic treatment. Weight loss of approx 30 lbs via weight watchers program.   4/19/2023 -  Patient returns to the office for follow up. Reports improving edema in bilateral lower extremities since beginning treatments in lymphedema clinic. Continues home exercise rehabilitation and physician supervised medical mgmt. Pain level @ rest - 8/10 Pain w/ activity: 10-10  03/17/2023 - Patient returns to the office for follow. Last seen approximate one month ago. Continues with bilateral lower extremity weakness. He continues to walk with a wheeled walker. Lower extremity vascular disease partially improved. He has seen the Edema clinic, waiting for appointment.  Pt is using a walker outside, and a cane at home.  Walking on uneven ground is difficult.  Pain with standing for long periods of time.  Pain localized to the lower back.  Gabapentin has been helpful.  Stopped gabapentin for 2-3 days and pain became very intense.  Pt C/O intermittent pain and giving way in the LT knee.   Pain:    At Rest: 7/10  With Activity:  9/10  Treatment: no Additional Information: accepted to lymphedema clinic  02/15/2023 - Patient returns to the office for follow. Last seen approximate one month ago. Continues with bilateral lower extremity weakness. He continues to walk with a wheeled walker. Lower extremity vascular disease essentially unchanged. He has seen the Edema clinic recently for consultation. He has begun to develop weeping in the right shin today. Pain:    At Rest: 8/10  With Activity:  9/10  Treatment: physical therapy Additional Information: accepted to lymphedema clinic  01/18/2023 - 58 y/o male presenting for a f/u lumbar spine. Currently enrolled in PT, which is helpful. He is gradually moving in the right direction, especially with his mobility and balance. He continues to ambulate with use of the walker. His chief complaint is b/l legs locking up, specifically while standing up. current medication regimen is helpful for symptom control. General health is good. Recent DOPLLER Exam and other vascular studies performed, he is scheduled to review the results. The atrophy and swelling in the right calf is improving with time.   12/14/2022 -The patient is a 57 year male  who presents today follow up low back. Patient returns to the office for routine follow up. Lasting approximately one month ago. He has followed up with vascular and has multiple studies pending to evaluate lower extremity vascular function, including Doppler and CÉSAR.  He request renewal of medication's in terms of gabapentin, narcotics and anti-inflammatory. He continues to walk with a walker. He's making progress with lower extremity wound healing and is no longer taking antibiotics.  Pain:    At Rest: 7/10  With Activity:  9/10   11/16/2022 - 58 y/o male presenting in follow up. Continues to have edema in the lower extremities. Following up with vascular specialist, infection was ruled out. Continues to ambulate with walker. States 20 lb antwon loss. Blood sugar levels are controlled. Currently in physical therapy and completing home exercise rehab. Balance is slowly improving.  Strength in the BLE slowly improving with time. Numbness in the right leg > left leg. Pain level remains the same. Continues to have episodic sciatic pain in the right thigh, worse while ambulating. Using Neurontin for symptom control. No changes in bowel/bladder function. Oxycodone, Meloxicam, and Gabapentin helpful for symptom control.   10/19/2022 - Patient presents in follow up. Recent flare up of cellulitis rt lower extremity, he has been taking antibiotics. Patients rt lower extremity is red/pink in office today. Inconsistency in physical therapy due to cellulitis flare up, but he is eager to return. Patient has been routinely incorporating exercises from PT into his daily routine. Denies sciatic pain, but reports posterior thigh pain. Patient is ambulating in office today with walker. C/o numbness in the rt foot, as well as weakness in the RLE.   09/21/2022: The patient is a 57 year male  who presents today follow up for low back pain Date of Injury/Onset: Pain:    At Rest: 7/10  With Activity:  9/10  Mechanism of injury:  Quality of symptoms:  Improves with:  Worse with: _ Treatment/Imaging/Studies Since Last Visit: physical therapy Reports Available For Review Today: _ Change since last visit:  Additional Information: None  Patient has been attending Pt - which have been slowly improving his symptoms. He is using a cane to ambulate up and down the stairs, as well as use of walker in office today. Decreased sodium levels as well as swelling in the lower extremities -  he has been following up with Cardiology. He is taking Lisinopril.

## 2024-02-08 NOTE — PHYSICAL EXAM
[Normal Coordination] : normal coordination [Normal DTR UE/LE] : normal DTR UE/LE  [Normal Sensation] : normal sensation [Normal Mood and Affect] : normal mood and affect [Orientated] : orientated [Able to Communicate] : able to communicate [Normal Skin] : normal skin [No Rash] : no rash [No Ulcers] : no ulcers [No Lesions] : no lesions [No obvious lymphadenopathy in areas examined] : no obvious lymphadenopathy in areas examined [Well Developed] : well developed [No Respiratory Distress] : no respiratory distress [Extension] : extension [4___] : right quadriceps 4[unfilled]/5 [Left lower extremity below knee] : left lower extremity below knee [Left lower extremity above knee] : left lower extremity above knee [Right lower extremity below knee] : right lower extremity below knee [Right lower extremity above knee] : right lower extremity above knee [] : ambulation with cane [de-identified] : obese [de-identified] : significant bilateral le edema [de-identified] : bilateral pitting edema

## 2024-02-08 NOTE — DISCUSSION/SUMMARY
[de-identified] : Prescription renewed. Patient will resume treatment with outpatient PT, emphasis on gait and balance training and lower extremity strengthening - renewal was issued today. F/U 4/6 WKS.   Prior to appointment and during encounter with patient extensive medical records were reviewed including but not limited to, hospital records, outpatient records, imaging results, and lab data. During this appointment the patient was examined, diagnoses were discussed and explained in a face to face manner. In addition extensive time was spent reviewing aforementioned diagnostic studies. Counseling including abnormal image results, differential diagnoses, treatment options, risk and benefits, lifestyle changes, current condition, and current medications was performed. Patient's comments, questions, and concerns were addressed and patient verbalized understanding. Based on this patient's presentation at our office, which is an orthopedic spine surgeon's office, this patient inherently / intrinsically has a risk, however minute, of developing issues such as Cauda equina syndrome, bowel and bladder changes, or progression of motor or neurological deficits such as paralysis which may be permanent.  CARISSA VALLADARES Acting as a Scribe for Dr. Jamal TRAN, Carissa Valladares, attest that this documentation has been prepared under the direction and in the presence of Provider Jonas Berry MD.

## 2024-02-09 ENCOUNTER — APPOINTMENT (OUTPATIENT)
Dept: ORTHOPEDIC SURGERY | Facility: CLINIC | Age: 59
End: 2024-02-09

## 2024-02-21 ENCOUNTER — APPOINTMENT (OUTPATIENT)
Dept: ORTHOPEDIC SURGERY | Facility: CLINIC | Age: 59
End: 2024-02-21

## 2024-03-01 ENCOUNTER — APPOINTMENT (OUTPATIENT)
Dept: ORTHOPEDIC SURGERY | Facility: CLINIC | Age: 59
End: 2024-03-01
Payer: COMMERCIAL

## 2024-03-01 VITALS — WEIGHT: 315 LBS | BODY MASS INDEX: 40.43 KG/M2 | HEIGHT: 74 IN

## 2024-03-01 DIAGNOSIS — M54.16 RADICULOPATHY, LUMBAR REGION: ICD-10-CM

## 2024-03-01 PROCEDURE — ZZZZZ: CPT

## 2024-03-03 ENCOUNTER — RX RENEWAL (OUTPATIENT)
Age: 59
End: 2024-03-03

## 2024-03-03 PROBLEM — M54.16 LUMBAR RADICULOPATHY: Status: ACTIVE | Noted: 2022-07-27

## 2024-03-03 NOTE — DISCUSSION/SUMMARY
[de-identified] : Prescription renewed. Patient will resume treatment with outpatient PT, emphasis on gait and balance training and lower extremity strengthening - renewal was issued today. F/U 4/6 WKS.   Prior to appointment and during encounter with patient extensive medical records were reviewed including but not limited to, hospital records, outpatient records, imaging results, and lab data. During this appointment the patient was examined, diagnoses were discussed and explained in a face to face manner. In addition extensive time was spent reviewing aforementioned diagnostic studies. Counseling including abnormal image results, differential diagnoses, treatment options, risk and benefits, lifestyle changes, current condition, and current medications was performed. Patient's comments, questions, and concerns were addressed and patient verbalized understanding. Based on this patient's presentation at our office, which is an orthopedic spine surgeon's office, this patient inherently / intrinsically has a risk, however minute, of developing issues such as Cauda equina syndrome, bowel and bladder changes, or progression of motor or neurological deficits such as paralysis which may be permanent.  CARISSA VALLADARES Acting as a Scribe for Dr. Jamal TRAN, Carissa Valladares, attest that this documentation has been prepared under the direction and in the presence of Provider Jonas Berry MD.

## 2024-03-03 NOTE — HISTORY OF PRESENT ILLNESS
[de-identified] : 03/01/2024 - Patient presenting in follow up. He has resumed PT emphasizing gait and balance training which has been significantly helpful. He continues to be ambulatory with walker, but c/o right sided groin pain worse while weight bearing. Taking meloxicam Neurontin, and oxycodone for symptom control.   02/07/2024 - Patient presenting for follow-up. He reports gashing his leg a few days after his last visit. He was treated with antibiotics, which somewhat improved his left leg, although it remains redder than baseline. He experiences an overall aching sensation in the low back, buttock, and lateral thigh. Taking meloxicam Neurontin, and oxycodone for symptom control .He believes additional physical therapy would be beneficial. Otherwise, there have been no changes to his general medical health.  01/05/2024 - Patient presenting in follow up. He reports severe flare up of cellulitis since he was last seen, was treated with Kephlex for 1 week and did not have to get admitted to hospital. Due to his gait, states his left knee has been locking. Otherwise, back pain and nerve like pains has been the same. Temporarily had to discontinue PT but he has returned which has been helpful. Taking meloxicam Neurontin, and oxycodone for symptom control.  12/06/2023 - Patient presenting in follow up. He remains in physical therapy which has been helpful. Overall strength and balance is improving with PT. Lymphedema has been stable. Taking meloxicam Neurontin, and oxycodone for symptom control. Ambulating with use of the cane. He complains of nerve like pains in the right thigh present while standing. Distal right leg goes numb while sitting diffusely. Left sided buttock ache, worse standing.   11/08/2023 - Pt presenting in follow up. Overall sxs are stable since he was last seen. He continues treatment with PT which has been overall helpful, balance improving as a result of PT. Lymphedema has been stable. Ambulating with use of the cane. He c/o hip pain, RLE numbness only present sitting in a chair. Taking meloxicam Neurontin, and oxycodone for symptom control.   10/11/2023 - Patient presenting for a follow up. Lymphedema is controlled, slight flare up over the last few days left side but improving. No longer in lymphedema clinic. Reports heightened left buttock pain without extending into the leg 2-3 weeks ago. Buttock pain is worse standing. He continues treatment with physical therapy and notes transient improvement in lower extremity strength. Notes set back in strength and increased unsteadiness while not enrolled in formal PT. Taking meloxicam Neurontin, and oxycodone for symptom control.   09/13/2023 - Patient presenting for a follow up. PT reinitiated treatment at the lymphedema clinic which has been significantly helpful for LE edema. He continues home stretching at home and has been enrolled in physical therapy which is helpful. He complains of low back pain, without radicular lower extremity pains. Overall severity is improved since he was last seen with prescribed treatments. Taking meloxicam Neurontin, and oxycodone for symptom control.   08/16/2023 - Patient presenting for a follow up. There is flared up of lower extremity edema a few wks ago, he is returning to lymphedema clinic next week. Ambulating with use of the cane. He is taking diuretic. Back pain remains the same.  Strength in the lower extremities remains stable. Increased instability, few falls. Returned to physical therapy, which was overall helpful, he is eager to continue. Taking Neurontin, meloxicam, and oxycodone for symptom control.  06/14/2023 - Patient presenting for a follow up. Strength in the lower extremities remains stable. Reports while stepping on Rt leg, reproduced focal pain in the RT thigh. He is weight bearing okay. Remains cautious while ambulating, no falls. Using cane. LE edema is gradually improving. Actively competing exercises from PT at home. Medication regimen with Meloxicam, Gabapentin, and oxycodone is helpful for symptom control.   5/17/23: Patient presenting in follow up. No falls since his last visit. Has transitioned to the cane, tolerating it okay. Balance is gradually improving. Edema in the legs is gradually improving. completed Edema clinic treatment. Weight loss of approx 30 lbs via weight watchers program.   4/19/2023 -  Patient returns to the office for follow up. Reports improving edema in bilateral lower extremities since beginning treatments in lymphedema clinic. Continues home exercise rehabilitation and physician supervised medical mgmt. Pain level @ rest - 8/10 Pain w/ activity: 10-10  03/17/2023 - Patient returns to the office for follow. Last seen approximate one month ago. Continues with bilateral lower extremity weakness. He continues to walk with a wheeled walker. Lower extremity vascular disease partially improved. He has seen the Edema clinic, waiting for appointment.  Pt is using a walker outside, and a cane at home.  Walking on uneven ground is difficult.  Pain with standing for long periods of time.  Pain localized to the lower back.  Gabapentin has been helpful.  Stopped gabapentin for 2-3 days and pain became very intense.  Pt C/O intermittent pain and giving way in the LT knee.   Pain:    At Rest: 7/10  With Activity:  9/10  Treatment: no Additional Information: accepted to lymphedema clinic  02/15/2023 - Patient returns to the office for follow. Last seen approximate one month ago. Continues with bilateral lower extremity weakness. He continues to walk with a wheeled walker. Lower extremity vascular disease essentially unchanged. He has seen the Edema clinic recently for consultation. He has begun to develop weeping in the right shin today. Pain:    At Rest: 8/10  With Activity:  9/10  Treatment: physical therapy Additional Information: accepted to lymphedema clinic  01/18/2023 - 58 y/o male presenting for a f/u lumbar spine. Currently enrolled in PT, which is helpful. He is gradually moving in the right direction, especially with his mobility and balance. He continues to ambulate with use of the walker. His chief complaint is b/l legs locking up, specifically while standing up. current medication regimen is helpful for symptom control. General health is good. Recent DOPLLER Exam and other vascular studies performed, he is scheduled to review the results. The atrophy and swelling in the right calf is improving with time.   12/14/2022 -The patient is a 57 year male  who presents today follow up low back. Patient returns to the office for routine follow up. Lasting approximately one month ago. He has followed up with vascular and has multiple studies pending to evaluate lower extremity vascular function, including Doppler and CÉSAR.  He request renewal of medication's in terms of gabapentin, narcotics and anti-inflammatory. He continues to walk with a walker. He's making progress with lower extremity wound healing and is no longer taking antibiotics.  Pain:    At Rest: 7/10  With Activity:  9/10   11/16/2022 - 58 y/o male presenting in follow up. Continues to have edema in the lower extremities. Following up with vascular specialist, infection was ruled out. Continues to ambulate with walker. States 20 lb antwon loss. Blood sugar levels are controlled. Currently in physical therapy and completing home exercise rehab. Balance is slowly improving.  Strength in the BLE slowly improving with time. Numbness in the right leg > left leg. Pain level remains the same. Continues to have episodic sciatic pain in the right thigh, worse while ambulating. Using Neurontin for symptom control. No changes in bowel/bladder function. Oxycodone, Meloxicam, and Gabapentin helpful for symptom control.   10/19/2022 - Patient presents in follow up. Recent flare up of cellulitis rt lower extremity, he has been taking antibiotics. Patients rt lower extremity is red/pink in office today. Inconsistency in physical therapy due to cellulitis flare up, but he is eager to return. Patient has been routinely incorporating exercises from PT into his daily routine. Denies sciatic pain, but reports posterior thigh pain. Patient is ambulating in office today with walker. C/o numbness in the rt foot, as well as weakness in the RLE.   09/21/2022: The patient is a 57 year male  who presents today follow up for low back pain Date of Injury/Onset: Pain:    At Rest: 7/10  With Activity:  9/10  Mechanism of injury:  Quality of symptoms:  Improves with:  Worse with: _ Treatment/Imaging/Studies Since Last Visit: physical therapy Reports Available For Review Today: _ Change since last visit:  Additional Information: None  Patient has been attending Pt - which have been slowly improving his symptoms. He is using a cane to ambulate up and down the stairs, as well as use of walker in office today. Decreased sodium levels as well as swelling in the lower extremities -  he has been following up with Cardiology. He is taking Lisinopril.

## 2024-03-03 NOTE — PHYSICAL EXAM
[Normal Coordination] : normal coordination [Normal DTR UE/LE] : normal DTR UE/LE  [Normal Sensation] : normal sensation [Normal Mood and Affect] : normal mood and affect [Oriented] : oriented [Normal Skin] : normal skin [Able to Communicate] : able to communicate [No Rash] : no rash [No Ulcers] : no ulcers [No Lesions] : no lesions [No obvious lymphadenopathy in areas examined] : no obvious lymphadenopathy in areas examined [No Respiratory Distress] : no respiratory distress [Well Developed] : well developed [Extension] : extension [4___] : right quadriceps 4[unfilled]/5 [Left lower extremity below knee] : left lower extremity below knee [Left lower extremity above knee] : left lower extremity above knee [Right lower extremity below knee] : right lower extremity below knee [Right lower extremity above knee] : right lower extremity above knee [] : ambulation with cane [de-identified] : obese [de-identified] : significant bilateral le edema [de-identified] : bilateral pitting edema

## 2024-03-29 ENCOUNTER — APPOINTMENT (OUTPATIENT)
Dept: ORTHOPEDIC SURGERY | Facility: CLINIC | Age: 59
End: 2024-03-29
Payer: COMMERCIAL

## 2024-03-29 VITALS — BODY MASS INDEX: 40.43 KG/M2 | WEIGHT: 315 LBS | HEIGHT: 74 IN

## 2024-03-29 PROCEDURE — ZZZZZ: CPT

## 2024-04-08 NOTE — PHYSICAL EXAM
[Normal Coordination] : normal coordination [Normal DTR UE/LE] : normal DTR UE/LE  [Normal Mood and Affect] : normal mood and affect [Normal Sensation] : normal sensation [Oriented] : oriented [Able to Communicate] : able to communicate [Normal Skin] : normal skin [No Ulcers] : no ulcers [No Rash] : no rash [No Lesions] : no lesions [No obvious lymphadenopathy in areas examined] : no obvious lymphadenopathy in areas examined [No Respiratory Distress] : no respiratory distress [Well Developed] : well developed [Extension] : extension [4___] : right quadriceps 4[unfilled]/5 [Left lower extremity below knee] : left lower extremity below knee [Left lower extremity above knee] : left lower extremity above knee [Right lower extremity below knee] : right lower extremity below knee [Right lower extremity above knee] : right lower extremity above knee [] : ambulation with cane [de-identified] : obese [de-identified] : significant bilateral le edema [de-identified] : bilateral pitting edema

## 2024-04-08 NOTE — DISCUSSION/SUMMARY
[de-identified] : Prescription renewed. Patient will resume treatment with outpatient PT, emphasis on gait and balance training and lower extremity strengthening - renewal was issued today. F/U 4/6 WKS.   Prior to appointment and during encounter with patient extensive medical records were reviewed including but not limited to, hospital records, outpatient records, imaging results, and lab data. During this appointment the patient was examined, diagnoses were discussed and explained in a face to face manner. In addition extensive time was spent reviewing aforementioned diagnostic studies. Counseling including abnormal image results, differential diagnoses, treatment options, risk and benefits, lifestyle changes, current condition, and current medications was performed. Patient's comments, questions, and concerns were addressed and patient verbalized understanding. Based on this patient's presentation at our office, which is an orthopedic spine surgeon's office, this patient inherently / intrinsically has a risk, however minute, of developing issues such as Cauda equina syndrome, bowel and bladder changes, or progression of motor or neurological deficits such as paralysis which may be permanent.  CARISSA VALLADARES Acting as a Scribe for Dr. Jamal TRAN, Carissa Valladares, attest that this documentation has been prepared under the direction and in the presence of Provider Jonas Berry MD.

## 2024-04-08 NOTE — HISTORY OF PRESENT ILLNESS
[de-identified] : 03/29/2024 - Patient presenting in follow up. He remains in PT which has been helpful in improving his balance. His lymphedema has been controlled. He does indicate left shin pain, but described as non radiating. Shin pain is present while weightbearing. . Taking meloxicam Neurontin, and oxycodone for symptom control. No change to general medical health. No recent falls. He does note difficulty with initiating his ability to start walking - present approx 1-2 months. No tremors noted in his hands.   03/01/2024 - Patient presenting in follow up. He has resumed PT emphasizing gait and balance training which has been significantly helpful. He continues to be ambulatory with walker, but c/o right sided groin pain worse while weight bearing. Taking meloxicam Neurontin, and oxycodone for symptom control.   02/07/2024 - Patient presenting for follow-up. He reports gashing his leg a few days after his last visit. He was treated with antibiotics, which somewhat improved his left leg, although it remains redder than baseline. He experiences an overall aching sensation in the low back, buttock, and lateral thigh. Taking meloxicam Neurontin, and oxycodone for symptom control .He believes additional physical therapy would be beneficial. Otherwise, there have been no changes to his general medical health.  01/05/2024 - Patient presenting in follow up. He reports severe flare up of cellulitis since he was last seen, was treated with Kephlex for 1 week and did not have to get admitted to hospital. Due to his gait, states his left knee has been locking. Otherwise, back pain and nerve like pains has been the same. Temporarily had to discontinue PT but he has returned which has been helpful. Taking meloxicam Neurontin, and oxycodone for symptom control.  12/06/2023 - Patient presenting in follow up. He remains in physical therapy which has been helpful. Overall strength and balance is improving with PT. Lymphedema has been stable. Taking meloxicam Neurontin, and oxycodone for symptom control. Ambulating with use of the cane. He complains of nerve like pains in the right thigh present while standing. Distal right leg goes numb while sitting diffusely. Left sided buttock ache, worse standing.   11/08/2023 - Pt presenting in follow up. Overall sxs are stable since he was last seen. He continues treatment with PT which has been overall helpful, balance improving as a result of PT. Lymphedema has been stable. Ambulating with use of the cane. He c/o hip pain, RLE numbness only present sitting in a chair. Taking meloxicam Neurontin, and oxycodone for symptom control.   10/11/2023 - Patient presenting for a follow up. Lymphedema is controlled, slight flare up over the last few days left side but improving. No longer in lymphedema clinic. Reports heightened left buttock pain without extending into the leg 2-3 weeks ago. Buttock pain is worse standing. He continues treatment with physical therapy and notes transient improvement in lower extremity strength. Notes set back in strength and increased unsteadiness while not enrolled in formal PT. Taking meloxicam Neurontin, and oxycodone for symptom control.   09/13/2023 - Patient presenting for a follow up. PT reinitiated treatment at the lymphedema clinic which has been significantly helpful for LE edema. He continues home stretching at home and has been enrolled in physical therapy which is helpful. He complains of low back pain, without radicular lower extremity pains. Overall severity is improved since he was last seen with prescribed treatments. Taking meloxicam Neurontin, and oxycodone for symptom control.   08/16/2023 - Patient presenting for a follow up. There is flared up of lower extremity edema a few wks ago, he is returning to lymphedema clinic next week. Ambulating with use of the cane. He is taking diuretic. Back pain remains the same.  Strength in the lower extremities remains stable. Increased instability, few falls. Returned to physical therapy, which was overall helpful, he is eager to continue. Taking Neurontin, meloxicam, and oxycodone for symptom control.  06/14/2023 - Patient presenting for a follow up. Strength in the lower extremities remains stable. Reports while stepping on Rt leg, reproduced focal pain in the RT thigh. He is weight bearing okay. Remains cautious while ambulating, no falls. Using cane. LE edema is gradually improving. Actively competing exercises from PT at home. Medication regimen with Meloxicam, Gabapentin, and oxycodone is helpful for symptom control.   5/17/23: Patient presenting in follow up. No falls since his last visit. Has transitioned to the cane, tolerating it okay. Balance is gradually improving. Edema in the legs is gradually improving. completed Edema clinic treatment. Weight loss of approx 30 lbs via weight watchers program.   4/19/2023 -  Patient returns to the office for follow up. Reports improving edema in bilateral lower extremities since beginning treatments in lymphedema clinic. Continues home exercise rehabilitation and physician supervised medical mgmt. Pain level @ rest - 8/10 Pain w/ activity: 10-10  03/17/2023 - Patient returns to the office for follow. Last seen approximate one month ago. Continues with bilateral lower extremity weakness. He continues to walk with a wheeled walker. Lower extremity vascular disease partially improved. He has seen the Edema clinic, waiting for appointment.  Pt is using a walker outside, and a cane at home.  Walking on uneven ground is difficult.  Pain with standing for long periods of time.  Pain localized to the lower back.  Gabapentin has been helpful.  Stopped gabapentin for 2-3 days and pain became very intense.  Pt C/O intermittent pain and giving way in the LT knee.   Pain:    At Rest: 7/10  With Activity:  9/10  Treatment: no Additional Information: accepted to lymphedema clinic  02/15/2023 - Patient returns to the office for follow. Last seen approximate one month ago. Continues with bilateral lower extremity weakness. He continues to walk with a wheeled walker. Lower extremity vascular disease essentially unchanged. He has seen the Edema clinic recently for consultation. He has begun to develop weeping in the right shin today. Pain:    At Rest: 8/10  With Activity:  9/10  Treatment: physical therapy Additional Information: accepted to lymphedema clinic  01/18/2023 - 58 y/o male presenting for a f/u lumbar spine. Currently enrolled in PT, which is helpful. He is gradually moving in the right direction, especially with his mobility and balance. He continues to ambulate with use of the walker. His chief complaint is b/l legs locking up, specifically while standing up. current medication regimen is helpful for symptom control. General health is good. Recent DOPLLER Exam and other vascular studies performed, he is scheduled to review the results. The atrophy and swelling in the right calf is improving with time.   12/14/2022 -The patient is a 57 year male  who presents today follow up low back. Patient returns to the office for routine follow up. Lasting approximately one month ago. He has followed up with vascular and has multiple studies pending to evaluate lower extremity vascular function, including Doppler and CÉSAR.  He request renewal of medication's in terms of gabapentin, narcotics and anti-inflammatory. He continues to walk with a walker. He's making progress with lower extremity wound healing and is no longer taking antibiotics.  Pain:    At Rest: 7/10  With Activity:  9/10   11/16/2022 - 58 y/o male presenting in follow up. Continues to have edema in the lower extremities. Following up with vascular specialist, infection was ruled out. Continues to ambulate with walker. States 20 lb antwon loss. Blood sugar levels are controlled. Currently in physical therapy and completing home exercise rehab. Balance is slowly improving.  Strength in the BLE slowly improving with time. Numbness in the right leg > left leg. Pain level remains the same. Continues to have episodic sciatic pain in the right thigh, worse while ambulating. Using Neurontin for symptom control. No changes in bowel/bladder function. Oxycodone, Meloxicam, and Gabapentin helpful for symptom control.   10/19/2022 - Patient presents in follow up. Recent flare up of cellulitis rt lower extremity, he has been taking antibiotics. Patients rt lower extremity is red/pink in office today. Inconsistency in physical therapy due to cellulitis flare up, but he is eager to return. Patient has been routinely incorporating exercises from PT into his daily routine. Denies sciatic pain, but reports posterior thigh pain. Patient is ambulating in office today with walker. C/o numbness in the rt foot, as well as weakness in the RLE.   09/21/2022: The patient is a 57 year male  who presents today follow up for low back pain Date of Injury/Onset: Pain:    At Rest: 7/10  With Activity:  9/10  Mechanism of injury:  Quality of symptoms:  Improves with:  Worse with: _ Treatment/Imaging/Studies Since Last Visit: physical therapy Reports Available For Review Today: _ Change since last visit:  Additional Information: None  Patient has been attending Pt - which have been slowly improving his symptoms. He is using a cane to ambulate up and down the stairs, as well as use of walker in office today. Decreased sodium levels as well as swelling in the lower extremities -  he has been following up with Cardiology. He is taking Lisinopril.

## 2024-04-12 ENCOUNTER — APPOINTMENT (OUTPATIENT)
Dept: ORTHOPEDIC SURGERY | Facility: CLINIC | Age: 59
End: 2024-04-12
Payer: COMMERCIAL

## 2024-04-12 PROCEDURE — ZZZZZ: CPT

## 2024-04-12 RX ORDER — LOSARTAN POTASSIUM 100 MG/1
TABLET, FILM COATED ORAL
Refills: 0 | Status: ACTIVE | COMMUNITY

## 2024-04-12 RX ORDER — LISINOPRIL 30 MG/1
30 TABLET ORAL
Qty: 60 | Refills: 0 | Status: DISCONTINUED | COMMUNITY
Start: 2022-04-25 | End: 2024-04-12

## 2024-04-12 RX ORDER — LISINOPRIL 20 MG/1
20 TABLET ORAL
Qty: 90 | Refills: 0 | Status: DISCONTINUED | COMMUNITY
Start: 2022-01-27 | End: 2024-04-12

## 2024-04-12 RX ORDER — MESALAMINE 500 MG/1
500 CAPSULE ORAL
Qty: 720 | Refills: 0 | Status: DISCONTINUED | COMMUNITY
Start: 2022-01-27 | End: 2024-04-12

## 2024-04-14 NOTE — DISCUSSION/SUMMARY
[de-identified] : 57 y/o M with acute cervical radicuolpathy, history of ACDF C5-7.  Xrays today demonstrate solid fusion c5-7. MRI requested of the cervical spine to evaluate for nerve compression likely C8 nerve root given contribution of sxs, patient has acute right arm weakness, pain, numbness (right intrinsics 3/5). Further treatment , injections vs decompressive surgery, plan pending results of MRI. He will follow up once the study is complete. Cumulative encounter time exceeded 30 minutes.  Prior to appointment and during encounter with patient extensive medical records were reviewed including but not limited to, hospital records, outpatient records, imaging results, and lab data. During this appointment the patient was examined, diagnoses were discussed and explained in a face to face manner. In addition extensive time was spent reviewing aforementioned diagnostic studies. Counseling including abnormal image results, differential diagnoses, treatment options, risk and benefits, lifestyle changes, current condition, and current medications was performed. Patient's comments, questions, and concerns were addressed and patient verbalized understanding. Based on this patient's presentation at our office, which is an orthopedic spine surgeon's office, this patient inherently / intrinsically has a risk, however minute, of developing issues such as Cauda equina syndrome, bowel and bladder changes, or progression of motor or neurological deficits such as paralysis which may be permanent.  CARISSA VALLADARES Acting as a Scribe for Dr. Jamal TRAN, Carissa Valladares, attest that this documentation has been prepared under the direction and in the presence of Provider Jonas Berry MD.

## 2024-04-14 NOTE — HISTORY OF PRESENT ILLNESS
[8] : 8 [5] : 5 [Radiating] : radiating [Sharp] : sharp [Stabbing] : stabbing [Tingling] : tingling [Constant] : constant [Heat] : heat [] : yes [Retired] : Work status: retired [de-identified] : 04/12/2024 - 59 y/o M presenting today for evaluation of RUE pain/numbness/weakness x early April 2024. History of ACDF C5-7. No trauma or injury. He describes pain as a knot sensation in his right scapula, intermittent radiation throughout his right arm, numbness in 4th and 5th digits. No left sided upper extremity sxs.  [de-identified] : movement [FreeTextEntry7] : rt shoulder blade/arm/ring&pinky finger, palm [de-identified] : orthopedic [de-identified] : 2015 [de-identified] : mri c-spine done at [facility] about 9-10yrs ago

## 2024-04-14 NOTE — REVIEW OF SYSTEMS
For the cough she should take Mucinex  mg twice a day and steam inhalation.  If the phlegm changes color to yellow or green then will order doxycycline 50 mg twice daily.  Patient is on fosfomycin 2 doses over 3 days she should continue that one  
Pt requesting an script to be sent to her [facility] for her horrible cough pt states she couldn't sleep last night her cough was so bad. Pt is aware Dr. Mccray is out of the office.   
Pt with hx aortic stenosis. Had tavr procedure recently. Also with afib on eliquis. She is home with part time caregiver. Former smoker.  Pt c/o 3 days frequent dry cough. There is no dysnea or fever. Her throat is sore and nose runny. She has not tried any otc. Coughs all night. Wants rx.  
[Negative] : Heme/Lymph

## 2024-04-14 NOTE — PHYSICAL EXAM
[5___] : right grasp 5[unfilled]/5 [Normal DTR UE/LE] : normal DTR UE/LE  [Normal Sensation] : normal sensation [Normal Mood and Affect] : normal mood and affect [Oriented] : oriented [Able to Communicate] : able to communicate [Normal Skin] : normal skin [No Rash] : no rash [No Ulcers] : no ulcers [No Lesions] : no lesions [No obvious lymphadenopathy in areas examined] : no obvious lymphadenopathy in areas examined [Well Developed] : well developed [No Respiratory Distress] : no respiratory distress [de-identified] : Constitutional: - General Appearance: Unremarkable Body Habitus Well Developed Well Nourished Body Habitus No Deformities Well Groomed Ability To communicate: Normal Neurologic: Global sensation is intact to upper and lower extremities. See examination of Neck and/or Spine for exceptions. Orientation to Time, Place and Person is: Normal Mood And Affect is Normal Skin: - Head/Face, Right Upper/Lower Extremity, Left Upper/Lower Extremity: Normal See Examination of Neck and/or Spine for exceptions Cardiovascular: Peripheral Cardiovascular System is Normal Palpation of Lymph Nodes: Normal Palpation of lymph nodes in: Axilla, Cervical, Inguinal Abnormal Palpation of lymph nodes in: None  [de-identified] : brauny edema bilat legs [de-identified] : obese [de-identified] : bilateral pretibial edema [] : sensation of right upper extremities not intact [de-identified] : right intrinsics , adductors 3/5

## 2024-05-10 ENCOUNTER — APPOINTMENT (OUTPATIENT)
Dept: ORTHOPEDIC SURGERY | Facility: CLINIC | Age: 59
End: 2024-05-10
Payer: COMMERCIAL

## 2024-05-10 VITALS — BODY MASS INDEX: 40.43 KG/M2 | WEIGHT: 315 LBS | HEIGHT: 74 IN

## 2024-05-10 PROCEDURE — ZZZZZ: CPT

## 2024-05-10 RX ORDER — MELOXICAM 15 MG/1
15 TABLET ORAL
Qty: 30 | Refills: 1 | Status: ACTIVE | COMMUNITY
Start: 2024-05-10 | End: 1900-01-01

## 2024-05-10 RX ORDER — OXYCODONE 10 MG/1
10 TABLET ORAL 4 TIMES DAILY
Qty: 120 | Refills: 0 | Status: ACTIVE | COMMUNITY
Start: 2024-05-10 | End: 1900-01-01

## 2024-05-12 NOTE — DATA REVIEWED
[MRI] : MRI [Cervical Spine] : cervical spine [Report was reviewed and noted in the chart] : The report was reviewed and noted in the chart [I independently reviewed and interpreted images and report] : I independently reviewed and interpreted images and report [I reviewed the films/CD and additionally noted] : I reviewed the films/CD and additionally noted [FreeTextEntry2] : [facility] April 17th 2024 - post  changes C5-7 with R > L foraminal stenosis C7/T1  [FreeTextEntry1] : I stop paperwork reviewed

## 2024-05-12 NOTE — HISTORY OF PRESENT ILLNESS
[9] : 9 [10] : 10 [Not working due to injury] : Work status: not working due to injury [de-identified] : 05/10/2024 - Patient presenting to review cervical mri. He complains of right scapula pain, pain/paresthesia's into his right arm. States physical therapy has been overall helpful and pain was improving, but pain became significant this week. Subjective weakness in his right hand.  04/12/2024 - 57 y/o M presenting today for evaluation of RUE pain/numbness/weakness x early April 2024. History of ACDF C5-7. No trauma or injury. He describes pain as a knot sensation in his right scapula, intermittent radiation throughout his right arm, numbness in 4th and 5th digits. No left sided upper extremity sxs.  [de-identified] : p/t

## 2024-05-12 NOTE — PHYSICAL EXAM
[Normal DTR UE/LE] : normal DTR UE/LE  [Normal Sensation] : normal sensation [Normal Mood and Affect] : normal mood and affect [Oriented] : oriented [Able to Communicate] : able to communicate [Normal Skin] : normal skin [No Rash] : no rash [No Ulcers] : no ulcers [No Lesions] : no lesions [No obvious lymphadenopathy in areas examined] : no obvious lymphadenopathy in areas examined [Well Developed] : well developed [No Respiratory Distress] : no respiratory distress [5___] : right grasp 5[unfilled]/5 [de-identified] : Constitutional: - General Appearance: Unremarkable Body Habitus Well Developed Well Nourished Body Habitus No Deformities Well Groomed Ability To communicate: Normal Neurologic: Global sensation is intact to upper and lower extremities. See examination of Neck and/or Spine for exceptions. Orientation to Time, Place and Person is: Normal Mood And Affect is Normal Skin: - Head/Face, Right Upper/Lower Extremity, Left Upper/Lower Extremity: Normal See Examination of Neck and/or Spine for exceptions Cardiovascular: Peripheral Cardiovascular System is Normal Palpation of Lymph Nodes: Normal Palpation of lymph nodes in: Axilla, Cervical, Inguinal Abnormal Palpation of lymph nodes in: None  [de-identified] : brauny edema bilat legs [de-identified] : obese [de-identified] : bilateral pretibial edema [] : sensation of right upper extremities not intact [de-identified] : right intrinsics , adductors 3/5

## 2024-05-12 NOTE — DISCUSSION/SUMMARY
[de-identified] : 57 y/o M with ongoing cervical radiculopathy, history of ACDF C5-7.  MRI - post  changes C5-7 with R > L foraminal stenosis C7/T1.  Next step recommend patient moves forward with HUEY for pain relief in his right arm. Also recommend patient receives EMG study to r/o peripheral etiology, referrals provided. Discussed continued medical mgmt and physical therapy trial. Follow up after mri.   Prior to appointment and during encounter with patient extensive medical records were reviewed including but not limited to, hospital records, outpatient records, imaging results, and lab data. During this appointment the patient was examined, diagnoses were discussed and explained in a face to face manner. In addition extensive time was spent reviewing aforementioned diagnostic studies. Counseling including abnormal image results, differential diagnoses, treatment options, risk and benefits, lifestyle changes, current condition, and current medications was performed. Patient's comments, questions, and concerns were addressed and patient verbalized understanding. Based on this patient's presentation at our office, which is an orthopedic spine surgeon's office, this patient inherently / intrinsically has a risk, however minute, of developing issues such as Cauda equina syndrome, bowel and bladder changes, or progression of motor or neurological deficits such as paralysis which may be permanent.  CARISSA VALLADARES Acting as a Scribe for Dr. Jamal TRAN, Carissa Valladares, attest that this documentation has been prepared under the direction and in the presence of Provider Jonas Berry MD.

## 2024-05-13 ENCOUNTER — APPOINTMENT (OUTPATIENT)
Dept: NEUROLOGY | Facility: CLINIC | Age: 59
End: 2024-05-13
Payer: COMMERCIAL

## 2024-05-13 PROCEDURE — 95911 NRV CNDJ TEST 9-10 STUDIES: CPT

## 2024-05-13 PROCEDURE — 95886 MUSC TEST DONE W/N TEST COMP: CPT

## 2024-05-21 ENCOUNTER — APPOINTMENT (OUTPATIENT)
Dept: PAIN MANAGEMENT | Facility: CLINIC | Age: 59
End: 2024-05-21
Payer: COMMERCIAL

## 2024-05-21 VITALS — HEIGHT: 74 IN | WEIGHT: 315 LBS | BODY MASS INDEX: 40.43 KG/M2

## 2024-05-21 PROCEDURE — 99204 OFFICE O/P NEW MOD 45 MIN: CPT

## 2024-05-21 RX ORDER — OXYCODONE 10 MG/1
10 TABLET ORAL 4 TIMES DAILY
Qty: 120 | Refills: 0 | Status: DISCONTINUED | COMMUNITY
Start: 2024-01-05 | End: 2024-05-21

## 2024-05-21 RX ORDER — OXYCODONE 10 MG/1
10 TABLET ORAL 4 TIMES DAILY
Qty: 120 | Refills: 0 | Status: DISCONTINUED | COMMUNITY
Start: 2023-12-06 | End: 2024-05-21

## 2024-05-21 RX ORDER — GABAPENTIN 300 MG/1
300 CAPSULE ORAL 3 TIMES DAILY
Qty: 90 | Refills: 3 | Status: DISCONTINUED | COMMUNITY
Start: 2023-11-08 | End: 2024-05-21

## 2024-05-21 RX ORDER — DIAZEPAM 5 MG/1
5 TABLET ORAL ONCE
Qty: 1 | Refills: 0 | Status: ACTIVE | COMMUNITY
Start: 2024-05-21 | End: 1900-01-01

## 2024-05-21 RX ORDER — MELOXICAM 15 MG/1
15 TABLET ORAL DAILY
Qty: 30 | Refills: 3 | Status: DISCONTINUED | COMMUNITY
Start: 2024-03-01 | End: 2024-05-21

## 2024-05-21 RX ORDER — METHYLPREDNISOLONE 4 MG/1
4 TABLET ORAL
Qty: 1 | Refills: 0 | Status: DISCONTINUED | COMMUNITY
Start: 2024-04-12 | End: 2024-05-21

## 2024-05-21 RX ORDER — MELOXICAM 15 MG/1
15 TABLET ORAL DAILY
Qty: 30 | Refills: 2 | Status: DISCONTINUED | COMMUNITY
Start: 2023-03-17 | End: 2024-05-21

## 2024-05-21 RX ORDER — MELOXICAM 15 MG/1
15 TABLET ORAL DAILY
Qty: 30 | Refills: 2 | Status: DISCONTINUED | COMMUNITY
Start: 2022-12-14 | End: 2024-05-21

## 2024-05-21 RX ORDER — OXYCODONE 10 MG/1
10 TABLET ORAL 4 TIMES DAILY
Qty: 120 | Refills: 0 | Status: DISCONTINUED | COMMUNITY
Start: 2024-02-07 | End: 2024-05-21

## 2024-05-21 RX ORDER — OXYCODONE 10 MG/1
10 TABLET ORAL 4 TIMES DAILY
Qty: 120 | Refills: 0 | Status: DISCONTINUED | COMMUNITY
Start: 2023-10-11 | End: 2024-05-21

## 2024-05-21 RX ORDER — OXYCODONE 10 MG/1
10 TABLET ORAL 4 TIMES DAILY
Qty: 120 | Refills: 0 | Status: DISCONTINUED | COMMUNITY
Start: 2022-07-27 | End: 2024-05-21

## 2024-05-21 RX ORDER — MELOXICAM 15 MG/1
15 TABLET ORAL DAILY
Qty: 30 | Refills: 1 | Status: DISCONTINUED | COMMUNITY
Start: 2023-01-18 | End: 2024-05-21

## 2024-05-21 RX ORDER — OXYCODONE 10 MG/1
10 TABLET ORAL 4 TIMES DAILY
Qty: 120 | Refills: 0 | Status: DISCONTINUED | COMMUNITY
Start: 2024-03-29 | End: 2024-05-21

## 2024-05-21 RX ORDER — MELOXICAM 15 MG/1
15 TABLET ORAL DAILY
Qty: 30 | Refills: 1 | Status: DISCONTINUED | COMMUNITY
Start: 2023-05-17 | End: 2024-05-21

## 2024-05-21 RX ORDER — OXYCODONE 10 MG/1
10 TABLET ORAL 4 TIMES DAILY
Qty: 120 | Refills: 0 | Status: DISCONTINUED | COMMUNITY
Start: 2024-03-01 | End: 2024-05-21

## 2024-05-21 RX ORDER — GABAPENTIN 300 MG/1
300 CAPSULE ORAL 3 TIMES DAILY
Qty: 90 | Refills: 2 | Status: DISCONTINUED | COMMUNITY
Start: 2023-01-18 | End: 2024-05-21

## 2024-05-21 RX ORDER — GABAPENTIN 300 MG/1
300 CAPSULE ORAL 3 TIMES DAILY
Qty: 90 | Refills: 2 | Status: DISCONTINUED | COMMUNITY
Start: 2022-09-21 | End: 2024-05-21

## 2024-05-21 RX ORDER — MELOXICAM 15 MG/1
15 TABLET ORAL DAILY
Qty: 30 | Refills: 2 | Status: DISCONTINUED | COMMUNITY
Start: 2023-11-08 | End: 2024-05-21

## 2024-05-21 RX ORDER — GABAPENTIN 300 MG/1
300 CAPSULE ORAL 3 TIMES DAILY
Qty: 90 | Refills: 3 | Status: DISCONTINUED | COMMUNITY
Start: 2024-03-01 | End: 2024-05-21

## 2024-05-21 RX ORDER — GABAPENTIN 300 MG/1
300 CAPSULE ORAL 3 TIMES DAILY
Qty: 90 | Refills: 2 | Status: DISCONTINUED | COMMUNITY
Start: 2022-12-14 | End: 2024-05-21

## 2024-05-21 RX ORDER — CEPHALEXIN 500 MG/1
500 CAPSULE ORAL 4 TIMES DAILY
Qty: 40 | Refills: 0 | Status: DISCONTINUED | COMMUNITY
Start: 2023-02-15 | End: 2024-05-21

## 2024-05-21 RX ORDER — OXYCODONE 10 MG/1
10 TABLET ORAL 4 TIMES DAILY
Qty: 120 | Refills: 0 | Status: DISCONTINUED | COMMUNITY
Start: 2022-08-24 | End: 2024-05-21

## 2024-05-21 RX ORDER — MELOXICAM 15 MG/1
15 TABLET ORAL
Qty: 30 | Refills: 2 | Status: DISCONTINUED | COMMUNITY
Start: 2022-11-16 | End: 2024-05-21

## 2024-05-21 RX ORDER — MELOXICAM 15 MG/1
15 TABLET ORAL DAILY
Qty: 30 | Refills: 1 | Status: DISCONTINUED | COMMUNITY
Start: 2023-06-14 | End: 2024-05-21

## 2024-05-21 RX ORDER — MELOXICAM 15 MG/1
15 TABLET ORAL
Qty: 30 | Refills: 2 | Status: DISCONTINUED | COMMUNITY
Start: 2022-07-27 | End: 2024-05-21

## 2024-05-21 RX ORDER — OXYCODONE 10 MG/1
10 TABLET ORAL 4 TIMES DAILY
Qty: 120 | Refills: 0 | Status: DISCONTINUED | COMMUNITY
Start: 2023-01-18 | End: 2024-05-21

## 2024-05-21 RX ORDER — MELOXICAM 15 MG/1
15 TABLET ORAL DAILY
Qty: 30 | Refills: 1 | Status: DISCONTINUED | COMMUNITY
Start: 2023-09-13 | End: 2024-05-21

## 2024-05-21 RX ORDER — OXYCODONE 10 MG/1
10 TABLET ORAL 4 TIMES DAILY
Qty: 120 | Refills: 0 | Status: DISCONTINUED | COMMUNITY
Start: 2023-03-17 | End: 2024-05-21

## 2024-05-21 RX ORDER — CEPHALEXIN 500 MG/1
500 CAPSULE ORAL 4 TIMES DAILY
Qty: 40 | Refills: 0 | Status: DISCONTINUED | COMMUNITY
Start: 2022-04-27 | End: 2024-05-21

## 2024-05-21 RX ORDER — GABAPENTIN 300 MG/1
300 CAPSULE ORAL 3 TIMES DAILY
Qty: 90 | Refills: 2 | Status: DISCONTINUED | COMMUNITY
Start: 2022-05-25 | End: 2024-05-21

## 2024-05-21 RX ORDER — MELOXICAM 15 MG/1
15 TABLET ORAL DAILY
Qty: 30 | Refills: 1 | Status: DISCONTINUED | COMMUNITY
Start: 2022-04-27 | End: 2024-05-21

## 2024-05-21 NOTE — REASON FOR VISIT
[Initial Consultation] : an initial pain management consultation [FreeTextEntry2] : Neck/right arm pain

## 2024-05-21 NOTE — ASSESSMENT
[FreeTextEntry1] : A discussion regarding available pain management treatment options occurred with the patient.  These included interventional, rehabilitative, pharmacological, and alternative modalities. We will proceed with the following:    Interventional treatment options:   - Proceed with right PM T1-T2 THESI with fluoroscopic guidance - see additional instructions below    Rehabilitative options: - continue physical therapy   - participation in active HEP was discussed    Medication based treatment options:   - Continue current medication regimen as per orthopedics - Gabapentin 900 mg daily; further titration based upon clinical response - Briefly discussed opioid down titration as tolerated - Continue meloxicam 15 mg daily as needed - see additional instructions below    Complementary treatment options:   - Weight management and lifestyle modifications discussed    Additional treatment recommendations as follows:   - patient will follow-up with Dr. Berry as directed - Follow up 1-2 weeks post injection for assessment of efficacy and further treatment recommendations  The risks, benefits and alternatives of the proposed procedure were explained in detail with the patient. The risks outlined include but are not limited to infection, bleeding, post- dural puncture headache, nerve injury, a temporary increase in pain, failure to resolve symptoms, need for future interventions, allergic reaction, and possible elevation of blood sugar in diabetics if using corticosteroid.  All questions were answered to patient's apparent satisfaction, and he/she verbalized an understanding.  We have discussed the risks, benefits, and alternatives for NSAID therapy including but not limited to the risk of bleeding, thrombosis, gastric mucosal irritation/ulceration, allergic reaction and kidney dysfunction.  The patient verbalizes an understanding.  The documentation recorded by the scribe, in my presence, accurately reflects the service I personally performed and the decisions made by me with my edits as appropriate.   I, Harman Alejandro acting as scribe, attest that this documentation has been prepared under the direction and in the presence of Provider Noel Arroyo DO.

## 2024-05-21 NOTE — PHYSICAL EXAM
[de-identified] : Constitutional:   - No acute distress   - Morbid obesity    Neurological:   - normal mood and affect   - alert and oriented x 3     Cardiovascular:   -Bilateral lower extremity venous stasis changes of lower extremities  Cervical Spine Exam:   Inspection:   erythema (-)   ecchymosis (-)   rashes (-)   Well-healed anterior surgical scar  Palpation:                                                    Cervical paraspinal tenderness:         R (-); L (-)  Upper trapezius tenderness:              R (+); L (-)  Rhomboids tenderness:                      R (+); L (-)  Occipital Ridge:                                    R (-); L (-)  Supraspinatus tenderness:                 R (-); L (-)   ROM: WNL  -Pain with extremes of extension  Strength Testing:              Deltoid                           R (5/5); L (5/5)  Biceps:                          R (5/5); L (5/5)  Triceps:                         R (5/5); L (5/5)  Finger Abductors:         R (4+/5); L (5/5)  Grasp:                           R (4+/5); L (5/5)   Special Testing:  Spurling Test:                  R (+); L (-)  Facet load test:               R (-); L (-)   Neuro:  SILT throughout right upper extremity with exception of right C8 distribution SILT throughout left upper extremity   Reflexes:  Biceps   -           R (2+); L (2+)  Triceps  -           R (2+); L (2+)  Brachioradialis- R (2+); L (2+)     No ankle clonus 
Principal Discharge DX:	Acute cystitis  Secondary Diagnosis:	Dehydration

## 2024-05-21 NOTE — HISTORY OF PRESENT ILLNESS
[Neck] : neck [Sudden] : sudden [6] : 6 [5] : 5 [Dull/Aching] : dull/aching [Intermittent] : intermittent [Meds] : meds [Physical therapy] : physical therapy [4] : 4 [Steroid] : Steroid [] : no [FreeTextEntry1] : shoulders, scapulas [FreeTextEntry7] : shoulders  [de-identified] : cervical spine mri  [TWNoteComboBox1] : 90%

## 2024-05-31 ENCOUNTER — APPOINTMENT (OUTPATIENT)
Dept: PAIN MANAGEMENT | Facility: CLINIC | Age: 59
End: 2024-05-31
Payer: COMMERCIAL

## 2024-05-31 PROCEDURE — 62321 NJX INTERLAMINAR CRV/THRC: CPT

## 2024-05-31 NOTE — PROCEDURE
[FreeTextEntry3] : Date of Service: 05/31/2024   Account: [id number]  Patient: TYRONE ARCOS   YOB: 1965  Age: 58 year  Surgeon:      Noel Arroyo DO  Assistant:    None  Pre-Operative Diagnosis:         Cervical Radiculopathy  Post Operative Diagnosis:       Cervical Radiculopathy  Procedure:             T1-T2 interlaminar epidural steroid injection under fluoroscopic guidance  Anesthesia: MAC  This procedure was carried out using fluoroscopic guidance.  The risks and benefits of the procedure were discussed extensively with the patient.  The consent of the patient was obtained and the following procedure was performed.  A timeout was performed with all essential staff present and the site and side were verified.  The patient was placed in the prone position and optimized to patient comfort.  The cervical area was prepped and draped in a sterile fashion.  The fluoroscope visualized the T1-T2 interspace using slight cephalad-caudad angulation and this area was marked.  Using sterile technique the superficial skin was anesthetized with 1% Lidocaine.  A 20-gauge 3.5-inch Tuohy needle was advanced under fluoroscopy until ligament was engaged.  Using a contralateral oblique view, a "loss of resistance" to air technique was utilized in order to gain access to the epidural space.  After negative aspiration for heme and CSF, 1 cc of Omnipaque contrast was administered and the appropriate cervical epidurogram was obtained in the LEVI and A/P view as well as digital subtraction angiography.  A total injectate of 3 cc of preservative free normal saline and 40 mg of Kenalog was then injected into the epidural space while maintaining meaningful verbal contact with the patient.    Vital signs remained normal throughout the procedure.  The patient tolerated the procedure well.  There were no immediate complications from the performed procedure.  The patient was instructed to apply ice over the injection sites for twenty minutes every two hours for the next 24 hours.  Disposition:      1. The patient was advised to F/U in 1-2 weeks to assess the response to the injection.      2. The patient was also instructed to contact me immediately if there were any concerns related to the procedure performed.

## 2024-06-13 ENCOUNTER — APPOINTMENT (OUTPATIENT)
Dept: PAIN MANAGEMENT | Facility: CLINIC | Age: 59
End: 2024-06-13

## 2024-06-13 VITALS — HEIGHT: 74 IN | WEIGHT: 315 LBS | BODY MASS INDEX: 40.43 KG/M2

## 2024-06-13 DIAGNOSIS — M48.061 SPINAL STENOSIS, LUMBAR REGION WITHOUT NEUROGENIC CLAUDICATION: ICD-10-CM

## 2024-06-13 DIAGNOSIS — M54.50 LOW BACK PAIN, UNSPECIFIED: ICD-10-CM

## 2024-06-13 DIAGNOSIS — M79.18 MYALGIA, OTHER SITE: ICD-10-CM

## 2024-06-13 DIAGNOSIS — Z98.1 ARTHRODESIS STATUS: ICD-10-CM

## 2024-06-13 DIAGNOSIS — M48.02 SPINAL STENOSIS, CERVICAL REGION: ICD-10-CM

## 2024-06-13 PROCEDURE — 99214 OFFICE O/P EST MOD 30 MIN: CPT

## 2024-06-13 RX ORDER — METHOCARBAMOL 750 MG/1
750 TABLET, FILM COATED ORAL 3 TIMES DAILY
Qty: 90 | Refills: 1 | Status: ACTIVE | COMMUNITY
Start: 2024-06-13 | End: 1900-01-01

## 2024-06-13 NOTE — PHYSICAL EXAM
[de-identified] : Constitutional:   - No acute distress   - Morbid obesity    Neurological:   - normal mood and affect   - alert and oriented x 3     Cardiovascular:   -Bilateral lower extremity venous stasis changes of lower extremities  Cervical Spine Exam:   Inspection:   erythema (-)   ecchymosis (-)   rashes (-)   Well-healed anterior surgical scar  Palpation:                                                    Cervical paraspinal tenderness:         R (-); L (-)  Upper trapezius tenderness:              R (+); L (-)  Thoracic paraspinal tenderness:         R (+); L (+) Rhomboids tenderness:                      R (+); L (-)  Occipital Ridge:                                    R (-); L (-)  Supraspinatus tenderness:                 R (-); L (-)   ROM: WNL  -Pain with extremes of extension  Strength Testing:              Deltoid                           R (5/5); L (5/5)  Biceps:                          R (5/5); L (5/5)  Triceps:                         R (5/5); L (5/5)  Finger Abductors:         R (4+/5); L (5/5)  Grasp:                           R (4+/5); L (5/5)   Special Testing:  Spurling Test:                  R (=); L (-)  Facet load test:               R (-); L (-)   Neuro:  SILT throughout right upper extremity with exception of right C8 distribution SILT throughout left upper extremity   Reflexes:  Biceps   -           R (2+); L (2+)  Triceps  -           R (2+); L (2+)  Brachioradialis- R (2+); L (2+)     No ankle clonus

## 2024-06-13 NOTE — ASSESSMENT
[FreeTextEntry1] : A discussion regarding available pain management treatment options occurred with the patient.  These included interventional, rehabilitative, pharmacological, and alternative modalities. We will proceed with the following:    Interventional treatment options:   -None indicated at present time -Can consider repeat T1-T2 THESI with return of severe neck/radicular pain -Discussed trial of TPI for refractory myofascial pain component - see additional instructions below    Rehabilitative options: -Completed recent physical therapy trial - participation in active HEP was discussed and encouraged as tolerated  Medication based treatment options:   - Continue current medication regimen as per orthopedics - Gabapentin 900 mg daily; further titration based upon clinical response - Continue oxycodone 10 mg up to TID as needed; down titration and cessation of chronic opioid use was discussed - Add Robaxin 750 mg up to BID as needed for spasm - Continue meloxicam 15 mg daily as needed - see additional instructions below    Complementary treatment options:   - Weight management and lifestyle modifications discussed    Additional treatment recommendations as follows:   - patient will follow-up with Dr. Berry as directed - Follow-up in 6 weeks or as-needed basis  We have discussed the risks, benefits, and alternatives for NSAID therapy including but not limited to the risk of bleeding, thrombosis, gastric mucosal irritation/ulceration, allergic reaction and kidney dysfunction.  The patient verbalizes an understanding.  The documentation recorded by the scribe, in my presence, accurately reflects the service I personally performed and the decisions made by me with my edits as appropriate.   I, Harman Alejandro acting as scribe, attest that this documentation has been prepared under the direction and in the presence of Provider Noel Arroyo DO.

## 2024-06-13 NOTE — HISTORY OF PRESENT ILLNESS
[Neck] : neck [Sudden] : sudden [6] : 6 [5] : 5 [Dull/Aching] : dull/aching [Intermittent] : intermittent [Meds] : meds [Physical therapy] : physical therapy [4] : 4 [Steroid] : Steroid [] : no [FreeTextEntry1] : shoulders, scapulas [FreeTextEntry7] : shoulders  [de-identified] : cervical spine mri  [TWNoteComboBox1] : 90%

## 2024-06-14 ENCOUNTER — APPOINTMENT (OUTPATIENT)
Dept: ORTHOPEDIC SURGERY | Facility: CLINIC | Age: 59
End: 2024-06-14

## 2024-06-14 VITALS — HEIGHT: 73 IN | BODY MASS INDEX: 41.75 KG/M2 | WEIGHT: 315 LBS

## 2024-06-14 DIAGNOSIS — M54.12 RADICULOPATHY, CERVICAL REGION: ICD-10-CM

## 2024-06-14 DIAGNOSIS — S06.4XAA EPIDURAL HEMORRHAGE WITH LOSS OF CONSCIOUSNESS STATUS UNKNOWN, INITIAL ENCOUNTER: ICD-10-CM

## 2024-06-14 DIAGNOSIS — M48.062 SPINAL STENOSIS, LUMBAR REGION WITH NEUROGENIC CLAUDICATION: ICD-10-CM

## 2024-06-14 PROCEDURE — 99213 OFFICE O/P EST LOW 20 MIN: CPT

## 2024-06-14 RX ORDER — OXYCODONE 10 MG/1
10 TABLET ORAL 4 TIMES DAILY
Qty: 120 | Refills: 0 | Status: ACTIVE | COMMUNITY
Start: 2024-06-14 | End: 1900-01-01

## 2024-06-16 NOTE — DISCUSSION/SUMMARY
[de-identified] : Discussed continued medical mgmt, prescriptions renewed. Discussed exercise based rehabilitation, renewal given for PT. Consider repeat injection if radicular pain worsens. Discussed ACDF C7T1 if refractory to non operative mgmt.

## 2024-06-16 NOTE — DATA REVIEWED
[EMG Nerve Conduction] : A EMG Nerve Conduction test was completed of the [Right] : right [Upper extremity] : upper extremity [Consistent with radiculopathy] : consistent with radiculopathy [MRI] : MRI [Cervical Spine] : cervical spine [Report was reviewed and noted in the chart] : The report was reviewed and noted in the chart [I independently reviewed and interpreted images and report] : I independently reviewed and interpreted images and report [FreeTextEntry1] : I stop paperwork reviewed Pain mgmt progress notes reviewed done

## 2024-06-16 NOTE — PHYSICAL EXAM
[Normal Coordination] : normal coordination [Normal DTR UE/LE] : normal DTR UE/LE  [Normal Sensation] : normal sensation [Normal Mood and Affect] : normal mood and affect [Oriented] : oriented [Able to Communicate] : able to communicate [Normal Skin] : normal skin [No Rash] : no rash [No Ulcers] : no ulcers [No Lesions] : no lesions [No obvious lymphadenopathy in areas examined] : no obvious lymphadenopathy in areas examined [Well Developed] : well developed [Peripheral vascular exam is grossly normal] : peripheral vascular exam is grossly normal [No Respiratory Distress] : no respiratory distress [Extension] : extension [4___] : right grasp 4[unfilled]/5 [] : positive Spurling

## 2024-06-16 NOTE — HISTORY OF PRESENT ILLNESS
[4] : 4 [2] : 2 [Disabled] : Work status: disabled [de-identified] : Right upper extremity pain, paresthesias and weakness improving following cervical epideural injection. [de-identified] : no treatment

## 2024-07-10 ENCOUNTER — APPOINTMENT (OUTPATIENT)
Dept: ORTHOPEDIC SURGERY | Facility: CLINIC | Age: 59
End: 2024-07-10

## 2024-07-10 VITALS — HEIGHT: 73 IN | WEIGHT: 315 LBS | BODY MASS INDEX: 41.75 KG/M2

## 2024-07-10 DIAGNOSIS — S06.4XAA EPIDURAL HEMORRHAGE WITH LOSS OF CONSCIOUSNESS STATUS UNKNOWN, INITIAL ENCOUNTER: ICD-10-CM

## 2024-07-10 DIAGNOSIS — M54.16 RADICULOPATHY, LUMBAR REGION: ICD-10-CM

## 2024-07-10 DIAGNOSIS — M48.02 SPINAL STENOSIS, CERVICAL REGION: ICD-10-CM

## 2024-07-10 DIAGNOSIS — M48.062 SPINAL STENOSIS, LUMBAR REGION WITH NEUROGENIC CLAUDICATION: ICD-10-CM

## 2024-07-10 PROCEDURE — ZZZZZ: CPT

## 2024-07-10 RX ORDER — OXYCODONE 10 MG/1
10 TABLET ORAL 4 TIMES DAILY
Qty: 120 | Refills: 0 | Status: ACTIVE | COMMUNITY
Start: 2024-07-10 | End: 1900-01-01

## 2024-07-10 RX ORDER — GABAPENTIN 300 MG/1
300 CAPSULE ORAL 3 TIMES DAILY
Qty: 90 | Refills: 1 | Status: ACTIVE | COMMUNITY
Start: 2024-07-10 | End: 1900-01-01

## 2024-08-14 ENCOUNTER — APPOINTMENT (OUTPATIENT)
Dept: ORTHOPEDIC SURGERY | Facility: CLINIC | Age: 59
End: 2024-08-14

## 2024-08-14 VITALS — BODY MASS INDEX: 41.75 KG/M2 | HEIGHT: 73 IN | WEIGHT: 315 LBS

## 2024-08-14 DIAGNOSIS — S06.4XAA EPIDURAL HEMORRHAGE WITH LOSS OF CONSCIOUSNESS STATUS UNKNOWN, INITIAL ENCOUNTER: ICD-10-CM

## 2024-08-14 DIAGNOSIS — M54.16 RADICULOPATHY, LUMBAR REGION: ICD-10-CM

## 2024-08-14 DIAGNOSIS — M48.062 SPINAL STENOSIS, LUMBAR REGION WITH NEUROGENIC CLAUDICATION: ICD-10-CM

## 2024-08-14 PROCEDURE — 99214 OFFICE O/P EST MOD 30 MIN: CPT

## 2024-08-14 RX ORDER — OXYCODONE 10 MG/1
10 TABLET ORAL 4 TIMES DAILY
Qty: 120 | Refills: 0 | Status: ACTIVE | COMMUNITY
Start: 2024-08-14 | End: 1900-01-01

## 2024-08-14 RX ORDER — METHYLPREDNISOLONE 4 MG/1
4 TABLET ORAL
Qty: 1 | Refills: 0 | Status: ACTIVE | COMMUNITY
Start: 2024-08-14 | End: 1900-01-01

## 2024-08-15 NOTE — DISCUSSION/SUMMARY
[Medication Risks Reviewed] : Medication risks reviewed [de-identified] : MRI Requests: Lumbar Spine MRI: r/p hnp vs stenosis. Prior surgery, heightened severity bilat lower ext radic refractory to PT and medical mgmt. Thoracic Spine MRI: h/o compressive epidural hematoma, r/o cord compression. Bilat lower ext radic, gait ataxia and worsening balance. Next Steps: Possible Lumbar Epidural Steroid Injection (LESI): Depending on the MRI results. Hold off on PT until review of the MRIS Renewed oxycodone. I am prescribing the patient MDP for pain relief. Titration schedule provided. Resume Meloxicam after MDP  Follow-Up: After the MRIs are completed, to discuss the findings and determine the appropriate next steps in management.  We have discussed the risks, benefits, and alternatives NSAID therapy including but not limited to the risk of bleeding, thrombosis, gastric mucosal irritation/ulceration, allergic reaction and kidney dysfunction; the patient verbalizes an understanding.   Prior to appointment and during encounter with patient extensive medical records were reviewed including but not limited to, hospital records, outpatient records, imaging results, and lab data. During this appointment the patient was examined, diagnoses were discussed and explained in a face to face manner. In addition extensive time was spent reviewing aforementioned diagnostic studies. Counseling including abnormal image results, differential diagnoses, treatment options, risk and benefits, lifestyle changes, current condition, and current medications was performed. Patient's comments, questions, and concerns were addressed and patient verbalized understanding. Based on this patient's presentation at our office, which is an orthopedic spine surgeon's office, this patient inherently / intrinsically has a risk, however minute, of developing issues such as Cauda equina syndrome, bowel and bladder changes, or progression of motor or neurological deficits such as paralysis which may be permanent.  CARISSA KAUR Acting as a Scribe for Carissa Dalal, attest that this documentation has been prepared under the direction and in the presence of Provider Jonas Berry MD.

## 2024-08-15 NOTE — DISCUSSION/SUMMARY
[Medication Risks Reviewed] : Medication risks reviewed [de-identified] : MRI Requests: Lumbar Spine MRI: r/p hnp vs stenosis. Prior surgery, heightened severity bilat lower ext radic refractory to PT and medical mgmt. Thoracic Spine MRI: h/o compressive epidural hematoma, r/o cord compression. Bilat lower ext radic, gait ataxia and worsening balance. Next Steps: Possible Lumbar Epidural Steroid Injection (LESI): Depending on the MRI results. Hold off on PT until review of the MRIS Renewed oxycodone. I am prescribing the patient MDP for pain relief. Titration schedule provided. Resume Meloxicam after MDP  Follow-Up: After the MRIs are completed, to discuss the findings and determine the appropriate next steps in management.  We have discussed the risks, benefits, and alternatives NSAID therapy including but not limited to the risk of bleeding, thrombosis, gastric mucosal irritation/ulceration, allergic reaction and kidney dysfunction; the patient verbalizes an understanding.   Prior to appointment and during encounter with patient extensive medical records were reviewed including but not limited to, hospital records, outpatient records, imaging results, and lab data. During this appointment the patient was examined, diagnoses were discussed and explained in a face to face manner. In addition extensive time was spent reviewing aforementioned diagnostic studies. Counseling including abnormal image results, differential diagnoses, treatment options, risk and benefits, lifestyle changes, current condition, and current medications was performed. Patient's comments, questions, and concerns were addressed and patient verbalized understanding. Based on this patient's presentation at our office, which is an orthopedic spine surgeon's office, this patient inherently / intrinsically has a risk, however minute, of developing issues such as Cauda equina syndrome, bowel and bladder changes, or progression of motor or neurological deficits such as paralysis which may be permanent.  CARISSA KAUR Acting as a Scribe for Carissa Dalal, attest that this documentation has been prepared under the direction and in the presence of Provider Jonas Berry MD.

## 2024-08-15 NOTE — HISTORY OF PRESENT ILLNESS
[de-identified] : 08/14/2024 - The patient reports a sudden significant increase in pain radiating into both legs and buttocks. The pain is described as severe and is exacerbated by standing and walking. There has been no trauma. He denies any changes in bowel or bladder function and does not experience any numbness in the thoracic region. He states it is hard to say if his balance is getting worse, but his activities are more limited due to pain vs any weakness. HE has been more sedentary and is experiencing lymphedema flare as a result.

## 2024-08-15 NOTE — PHYSICAL EXAM
[Normal Coordination] : normal coordination [Normal DTR UE/LE] : normal DTR UE/LE  [Normal Sensation] : normal sensation [Normal Mood and Affect] : normal mood and affect [Oriented] : oriented [Able to Communicate] : able to communicate [Normal Skin] : normal skin [No Rash] : no rash [No Ulcers] : no ulcers [No Lesions] : no lesions [No obvious lymphadenopathy in areas examined] : no obvious lymphadenopathy in areas examined [Well Developed] : well developed [Peripheral vascular exam is grossly normal] : peripheral vascular exam is grossly normal [No Respiratory Distress] : no respiratory distress [Flexion] : flexion [Extension] : extension [] : negative sitting straight leg raise [de-identified] : increased edema bilateral lower extremities

## 2024-08-15 NOTE — PHYSICAL EXAM
[Normal Coordination] : normal coordination [Normal DTR UE/LE] : normal DTR UE/LE  [Normal Sensation] : normal sensation [Normal Mood and Affect] : normal mood and affect [Oriented] : oriented [Able to Communicate] : able to communicate [Normal Skin] : normal skin [No Rash] : no rash [No Ulcers] : no ulcers [No Lesions] : no lesions [No obvious lymphadenopathy in areas examined] : no obvious lymphadenopathy in areas examined [Well Developed] : well developed [Peripheral vascular exam is grossly normal] : peripheral vascular exam is grossly normal [No Respiratory Distress] : no respiratory distress [Flexion] : flexion [Extension] : extension [] : negative sitting straight leg raise [de-identified] : increased edema bilateral lower extremities

## 2024-08-15 NOTE — HISTORY OF PRESENT ILLNESS
[de-identified] : 08/14/2024 - The patient reports a sudden significant increase in pain radiating into both legs and buttocks. The pain is described as severe and is exacerbated by standing and walking. There has been no trauma. He denies any changes in bowel or bladder function and does not experience any numbness in the thoracic region. He states it is hard to say if his balance is getting worse, but his activities are more limited due to pain vs any weakness. HE has been more sedentary and is experiencing lymphedema flare as a result.

## 2024-08-15 NOTE — DATA REVIEWED
[FreeTextEntry1] : I stop paperwork reviewed PT progress notes reviewed Pain mgmt progress notes reviewed

## 2024-08-28 ENCOUNTER — APPOINTMENT (OUTPATIENT)
Dept: ORTHOPEDIC SURGERY | Facility: CLINIC | Age: 59
End: 2024-08-28

## 2024-08-28 DIAGNOSIS — M48.062 SPINAL STENOSIS, LUMBAR REGION WITH NEUROGENIC CLAUDICATION: ICD-10-CM

## 2024-08-28 DIAGNOSIS — M54.16 RADICULOPATHY, LUMBAR REGION: ICD-10-CM

## 2024-08-28 PROCEDURE — 99214 OFFICE O/P EST MOD 30 MIN: CPT

## 2024-08-29 NOTE — PHYSICAL EXAM
[Normal Coordination] : normal coordination [Normal DTR UE/LE] : normal DTR UE/LE  [Normal Sensation] : normal sensation [Normal Mood and Affect] : normal mood and affect [Oriented] : oriented [Able to Communicate] : able to communicate [Normal Skin] : normal skin [No Rash] : no rash [No Ulcers] : no ulcers [No Lesions] : no lesions [No obvious lymphadenopathy in areas examined] : no obvious lymphadenopathy in areas examined [Well Developed] : well developed [Peripheral vascular exam is grossly normal] : peripheral vascular exam is grossly normal [No Respiratory Distress] : no respiratory distress [Flexion] : flexion [Extension] : extension [3___] : right hip flexion 3[unfilled]/5 [] : negative sitting straight leg raise [de-identified] : increased edema bilateral lower extremities

## 2024-08-29 NOTE — HISTORY OF PRESENT ILLNESS
[de-identified] : 08/28/2024 - The patient presents to review MRI results. He reports difficulty standing straight due to back pain and is experiencing leg oozing secondary to lymphedema. His walking is significantly limited, and he uses a walker for mobility. Has experienced significant progressive worsening of bilateral lower extremity weakness over the past 5-6 weeks.  08/14/2024 - The patient reports a sudden significant increase in pain radiating into both legs and buttocks. The pain is described as severe and is exacerbated by standing and walking. There has been no trauma. He denies any changes in bowel or bladder function and does not experience any numbness in the thoracic region. He states it is hard to say if his balance is getting worse, but his activities are more limited due to pain vs any weakness. HE has been more sedentary and is experiencing lymphedema flare as a result.

## 2024-08-29 NOTE — DISCUSSION/SUMMARY
[Medication Risks Reviewed] : Medication risks reviewed [de-identified] : The thoracic MRI is clear without evidence of a hematoma. Lumbar MRI is positive for a disc herniation and severe stenosis at the L2/3 level (transitional anatomy), which is above the previously operated level at L3-4, L4/5. Treatment options were discussed, including interventional lumbar epidural steroid injection (LESI) versus surgical intervention. Given the severity of the stenosis evident on the MRI, the progressive loss of lower extremity strength and decline in independent ambulatory capacity,  it was discussed that a lumbar epidural steroid injection (LESI) would likely only alleviate pain temporarily. Therefore, I am indicating the patient for TLIF L2-3(transitional anatomy). Surgical risks, benefits and expected outcomes have been explained to the patient. Patient was understanding and in agreement. Pt elects to proceed - f/u with surgical queen. Pt will call in 2 wks for medication renewal.   Prior to appointment and during encounter with patient extensive medical records were reviewed including but not limited to, hospital records, outpatient records, imaging results, and lab data. During this appointment the patient was examined, diagnoses were discussed and explained in a face to face manner. In addition extensive time was spent reviewing aforementioned diagnostic studies. Counseling including abnormal image results, differential diagnoses, treatment options, risk and benefits, lifestyle changes, current condition, and current medications was performed. Patient's comments, questions, and concerns were addressed and patient verbalized understanding. Based on this patient's presentation at our office, which is an orthopedic spine surgeon's office, this patient inherently / intrinsically has a risk, however minute, of developing issues such as Cauda equina syndrome, bowel and bladder changes, or progression of motor or neurological deficits such as paralysis which may be permanent.  CARISSA VALLADARES Acting as a Scribe for Dr. Jamal TRAN, Carissa Valladares, attest that this documentation has been prepared under the direction and in the presence of Provider Jonas Berry MD.

## 2024-08-29 NOTE — HISTORY OF PRESENT ILLNESS
[de-identified] : 08/28/2024 - The patient presents to review MRI results. He reports difficulty standing straight due to back pain and is experiencing leg oozing secondary to lymphedema. His walking is significantly limited, and he uses a walker for mobility. Has experienced significant progressive worsening of bilateral lower extremity weakness over the past 5-6 weeks.  08/14/2024 - The patient reports a sudden significant increase in pain radiating into both legs and buttocks. The pain is described as severe and is exacerbated by standing and walking. There has been no trauma. He denies any changes in bowel or bladder function and does not experience any numbness in the thoracic region. He states it is hard to say if his balance is getting worse, but his activities are more limited due to pain vs any weakness. HE has been more sedentary and is experiencing lymphedema flare as a result.

## 2024-08-29 NOTE — DATA REVIEWED
[FreeTextEntry1] : On my interpretation of these images Standup mri 8/22/24  Lumbar spine mri - L2/3 severe stenosis above operative level L3-4, L4/5(transitional L5S1).  Thoracic mri - no evidence for hematoma   I stop paperwork reviewed PT progress notes reviewed

## 2024-08-29 NOTE — DISCUSSION/SUMMARY
[Medication Risks Reviewed] : Medication risks reviewed [de-identified] : The thoracic MRI is clear without evidence of a hematoma. Lumbar MRI is positive for a disc herniation and severe stenosis at the L2/3 level (transitional anatomy), which is above the previously operated level at L3-4, L4/5. Treatment options were discussed, including interventional lumbar epidural steroid injection (LESI) versus surgical intervention. Given the severity of the stenosis evident on the MRI, the progressive loss of lower extremity strength and decline in independent ambulatory capacity,  it was discussed that a lumbar epidural steroid injection (LESI) would likely only alleviate pain temporarily. Therefore, I am indicating the patient for TLIF L2-3(transitional anatomy). Surgical risks, benefits and expected outcomes have been explained to the patient. Patient was understanding and in agreement. Pt elects to proceed - f/u with surgical queen. Pt will call in 2 wks for medication renewal.   Prior to appointment and during encounter with patient extensive medical records were reviewed including but not limited to, hospital records, outpatient records, imaging results, and lab data. During this appointment the patient was examined, diagnoses were discussed and explained in a face to face manner. In addition extensive time was spent reviewing aforementioned diagnostic studies. Counseling including abnormal image results, differential diagnoses, treatment options, risk and benefits, lifestyle changes, current condition, and current medications was performed. Patient's comments, questions, and concerns were addressed and patient verbalized understanding. Based on this patient's presentation at our office, which is an orthopedic spine surgeon's office, this patient inherently / intrinsically has a risk, however minute, of developing issues such as Cauda equina syndrome, bowel and bladder changes, or progression of motor or neurological deficits such as paralysis which may be permanent.  CARISSA VALLADARES Acting as a Scribe for Dr. Jamal TRAN, Carissa Valladares, attest that this documentation has been prepared under the direction and in the presence of Provider Jonas Berry MD.

## 2024-08-29 NOTE — PHYSICAL EXAM
[Normal Coordination] : normal coordination [Normal DTR UE/LE] : normal DTR UE/LE  [Normal Sensation] : normal sensation [Normal Mood and Affect] : normal mood and affect [Oriented] : oriented [Able to Communicate] : able to communicate [Normal Skin] : normal skin [No Rash] : no rash [No Ulcers] : no ulcers [No Lesions] : no lesions [No obvious lymphadenopathy in areas examined] : no obvious lymphadenopathy in areas examined [Well Developed] : well developed [Peripheral vascular exam is grossly normal] : peripheral vascular exam is grossly normal [No Respiratory Distress] : no respiratory distress [Flexion] : flexion [Extension] : extension [3___] : right hip flexion 3[unfilled]/5 [] : negative sitting straight leg raise [de-identified] : increased edema bilateral lower extremities

## 2024-09-06 ENCOUNTER — APPOINTMENT (OUTPATIENT)
Dept: ORTHOPEDIC SURGERY | Facility: CLINIC | Age: 59
End: 2024-09-06

## 2024-09-07 ENCOUNTER — RX RENEWAL (OUTPATIENT)
Age: 59
End: 2024-09-07

## 2024-09-12 ENCOUNTER — RESULT REVIEW (OUTPATIENT)
Age: 59
End: 2024-09-12

## 2024-09-12 ENCOUNTER — OUTPATIENT (OUTPATIENT)
Dept: OUTPATIENT SERVICES | Facility: HOSPITAL | Age: 59
LOS: 1 days | End: 2024-09-12
Payer: COMMERCIAL

## 2024-09-12 VITALS
HEIGHT: 73 IN | HEART RATE: 70 BPM | TEMPERATURE: 98 F | WEIGHT: 315 LBS | DIASTOLIC BLOOD PRESSURE: 70 MMHG | OXYGEN SATURATION: 95 % | RESPIRATION RATE: 20 BRPM | SYSTOLIC BLOOD PRESSURE: 130 MMHG

## 2024-09-12 DIAGNOSIS — Z29.9 ENCOUNTER FOR PROPHYLACTIC MEASURES, UNSPECIFIED: ICD-10-CM

## 2024-09-12 DIAGNOSIS — Z01.818 ENCOUNTER FOR OTHER PREPROCEDURAL EXAMINATION: ICD-10-CM

## 2024-09-12 DIAGNOSIS — M51.26 OTHER INTERVERTEBRAL DISC DISPLACEMENT, LUMBAR REGION: ICD-10-CM

## 2024-09-12 DIAGNOSIS — Z98.890 OTHER SPECIFIED POSTPROCEDURAL STATES: Chronic | ICD-10-CM

## 2024-09-12 DIAGNOSIS — I38 ENDOCARDITIS, VALVE UNSPECIFIED: ICD-10-CM

## 2024-09-12 DIAGNOSIS — Z98.1 ARTHRODESIS STATUS: Chronic | ICD-10-CM

## 2024-09-12 LAB
A1C WITH ESTIMATED AVERAGE GLUCOSE RESULT: 5.3 % — SIGNIFICANT CHANGE UP (ref 4–5.6)
ALBUMIN SERPL ELPH-MCNC: 3.9 G/DL — SIGNIFICANT CHANGE UP (ref 3.3–5.2)
ALP SERPL-CCNC: 67 U/L — SIGNIFICANT CHANGE UP (ref 40–120)
ALT FLD-CCNC: 21 U/L — SIGNIFICANT CHANGE UP
ANION GAP SERPL CALC-SCNC: 10 MMOL/L — SIGNIFICANT CHANGE UP (ref 5–17)
APTT BLD: 37.2 SEC — HIGH (ref 24.5–35.6)
AST SERPL-CCNC: 18 U/L — SIGNIFICANT CHANGE UP
BASOPHILS # BLD AUTO: 0.04 K/UL — SIGNIFICANT CHANGE UP (ref 0–0.2)
BASOPHILS NFR BLD AUTO: 0.7 % — SIGNIFICANT CHANGE UP (ref 0–2)
BILIRUB SERPL-MCNC: 0.4 MG/DL — SIGNIFICANT CHANGE UP (ref 0.4–2)
BLD GP AB SCN SERPL QL: SIGNIFICANT CHANGE UP
BUN SERPL-MCNC: 18.3 MG/DL — SIGNIFICANT CHANGE UP (ref 8–20)
CALCIUM SERPL-MCNC: 8.9 MG/DL — SIGNIFICANT CHANGE UP (ref 8.4–10.5)
CHLORIDE SERPL-SCNC: 100 MMOL/L — SIGNIFICANT CHANGE UP (ref 96–108)
CO2 SERPL-SCNC: 27 MMOL/L — SIGNIFICANT CHANGE UP (ref 22–29)
CREAT SERPL-MCNC: 0.84 MG/DL — SIGNIFICANT CHANGE UP (ref 0.5–1.3)
EGFR: 100 ML/MIN/1.73M2 — SIGNIFICANT CHANGE UP
EOSINOPHIL # BLD AUTO: 0.1 K/UL — SIGNIFICANT CHANGE UP (ref 0–0.5)
EOSINOPHIL NFR BLD AUTO: 1.7 % — SIGNIFICANT CHANGE UP (ref 0–6)
ESTIMATED AVERAGE GLUCOSE: 105 MG/DL — SIGNIFICANT CHANGE UP (ref 68–114)
GLUCOSE SERPL-MCNC: 90 MG/DL — SIGNIFICANT CHANGE UP (ref 70–99)
HCT VFR BLD CALC: 41.6 % — SIGNIFICANT CHANGE UP (ref 39–50)
HGB BLD-MCNC: 14.2 G/DL — SIGNIFICANT CHANGE UP (ref 13–17)
IMM GRANULOCYTES NFR BLD AUTO: 0.3 % — SIGNIFICANT CHANGE UP (ref 0–0.9)
INR BLD: 1.07 RATIO — SIGNIFICANT CHANGE UP (ref 0.85–1.18)
LYMPHOCYTES # BLD AUTO: 1.03 K/UL — SIGNIFICANT CHANGE UP (ref 1–3.3)
LYMPHOCYTES # BLD AUTO: 17.9 % — SIGNIFICANT CHANGE UP (ref 13–44)
MCHC RBC-ENTMCNC: 30.5 PG — SIGNIFICANT CHANGE UP (ref 27–34)
MCHC RBC-ENTMCNC: 34.1 GM/DL — SIGNIFICANT CHANGE UP (ref 32–36)
MCV RBC AUTO: 89.5 FL — SIGNIFICANT CHANGE UP (ref 80–100)
MONOCYTES # BLD AUTO: 0.41 K/UL — SIGNIFICANT CHANGE UP (ref 0–0.9)
MONOCYTES NFR BLD AUTO: 7.1 % — SIGNIFICANT CHANGE UP (ref 2–14)
MRSA PCR RESULT.: SIGNIFICANT CHANGE UP
NEUTROPHILS # BLD AUTO: 4.14 K/UL — SIGNIFICANT CHANGE UP (ref 1.8–7.4)
NEUTROPHILS NFR BLD AUTO: 72.3 % — SIGNIFICANT CHANGE UP (ref 43–77)
PLATELET # BLD AUTO: 181 K/UL — SIGNIFICANT CHANGE UP (ref 150–400)
POTASSIUM SERPL-MCNC: 4.6 MMOL/L — SIGNIFICANT CHANGE UP (ref 3.5–5.3)
POTASSIUM SERPL-SCNC: 4.6 MMOL/L — SIGNIFICANT CHANGE UP (ref 3.5–5.3)
PROT SERPL-MCNC: 7.1 G/DL — SIGNIFICANT CHANGE UP (ref 6.6–8.7)
PROTHROM AB SERPL-ACNC: 11.8 SEC — SIGNIFICANT CHANGE UP (ref 9.5–13)
RBC # BLD: 4.65 M/UL — SIGNIFICANT CHANGE UP (ref 4.2–5.8)
RBC # FLD: 13.2 % — SIGNIFICANT CHANGE UP (ref 10.3–14.5)
S AUREUS DNA NOSE QL NAA+PROBE: SIGNIFICANT CHANGE UP
SODIUM SERPL-SCNC: 137 MMOL/L — SIGNIFICANT CHANGE UP (ref 135–145)
WBC # BLD: 5.74 K/UL — SIGNIFICANT CHANGE UP (ref 3.8–10.5)
WBC # FLD AUTO: 5.74 K/UL — SIGNIFICANT CHANGE UP (ref 3.8–10.5)

## 2024-09-12 PROCEDURE — 71046 X-RAY EXAM CHEST 2 VIEWS: CPT

## 2024-09-12 PROCEDURE — 80053 COMPREHEN METABOLIC PANEL: CPT

## 2024-09-12 PROCEDURE — 86901 BLOOD TYPING SEROLOGIC RH(D): CPT

## 2024-09-12 PROCEDURE — 87641 MR-STAPH DNA AMP PROBE: CPT

## 2024-09-12 PROCEDURE — 86850 RBC ANTIBODY SCREEN: CPT

## 2024-09-12 PROCEDURE — 85730 THROMBOPLASTIN TIME PARTIAL: CPT

## 2024-09-12 PROCEDURE — 36415 COLL VENOUS BLD VENIPUNCTURE: CPT

## 2024-09-12 PROCEDURE — 93005 ELECTROCARDIOGRAM TRACING: CPT

## 2024-09-12 PROCEDURE — 85610 PROTHROMBIN TIME: CPT

## 2024-09-12 PROCEDURE — 86900 BLOOD TYPING SEROLOGIC ABO: CPT

## 2024-09-12 PROCEDURE — 87640 STAPH A DNA AMP PROBE: CPT

## 2024-09-12 PROCEDURE — 71046 X-RAY EXAM CHEST 2 VIEWS: CPT | Mod: 26

## 2024-09-12 PROCEDURE — 85025 COMPLETE CBC W/AUTO DIFF WBC: CPT

## 2024-09-12 PROCEDURE — 83036 HEMOGLOBIN GLYCOSYLATED A1C: CPT

## 2024-09-12 PROCEDURE — 93010 ELECTROCARDIOGRAM REPORT: CPT

## 2024-09-12 PROCEDURE — G0463: CPT

## 2024-09-12 NOTE — H&P PST ADULT - ASSESSMENT
Patient instructed on NPO protocol   Patient instructed to stop NSAID, all vitamins supplement, Fish oil, COQ 10,  herbals 5 days before this surgery.   Pt okay to take Tylenol as needed for pain   Patient will continue to take all his medications as prescribed    Patient instructed on infection prevention   Chlorhexidine scrub instructions provided    CAPRINI VTE 2.0 SCORE [CLOT updated 2019]    AGE RELATED RISK FACTORS                                                       MOBILITY RELATED FACTORS  [x ] Age 41-60 years                                            (1 Point)                    [ ] Bed rest                                                        (1 Point)  [ ] Age: 61-74 years                                           (2 Points)                  [ ] Plaster cast                                                   (2 Points)  [ ] Age= 75 years                                              (3 Points)                    [ ] Bed bound for more than 72 hours                 (2 Points)    DISEASE RELATED RISK FACTORS                                               GENDER SPECIFIC FACTORS  [ ] Edema in the lower extremities                       (1 Point)              [ ] Pregnancy                                                     (1 Point)  [ ] Varicose veins                                               (1 Point)                     [ ] Post-partum < 6 weeks                                   (1 Point)             [x ] BMI > 25 Kg/m2                                            (1 Point)                     [ ] Hormonal therapy  or oral contraception          (1 Point)                 [ ] Sepsis (in the previous month)                        (1 Point)               [ ] History of pregnancy complications                 (1 point)  [ ] Pneumonia or serious lung disease                                               [ ] Unexplained or recurrent                     (1 Point)           (in the previous month)                               (1 Point)  [ ] Abnormal pulmonary function test                     (1 Point)                 SURGERY RELATED RISK FACTORS  [ ] Acute myocardial infarction                              (1 Point)               [ ]  Section                                             (1 Point)  [ ] Congestive heart failure (in the previous month)  (1 Point)      [ ] Minor surgery                                                  (1 Point)   [ ] Inflammatory bowel disease                             (1 Point)               [ ] Arthroscopic surgery                                        (2 Points)  [ ] Central venous access                                      (2 Points)                [x ] General surgery lasting more than 45 minutes (2 points)  [ ] Malignancy- Present or previous                   (2 Points)                [ ] Elective arthroplasty                                         (5 points)    [ ] Stroke (in the previous month)                          (5 Points)                                                                                                                                                           HEMATOLOGY RELATED FACTORS                                                 TRAUMA RELATED RISK FACTORS  [ ] Prior episodes of VTE                                     (3 Points)                [ ] Fracture of the hip, pelvis, or leg                       (5 Points)  [ ] Positive family history for VTE                         (3 Points)             [ ] Acute spinal cord injury (in the previous month)  (5 Points)  [ ] Prothrombin 59238 A                                     (3 Points)               [ ] Paralysis  (less than 1 month)                             (5 Points)  [ ] Factor V Leiden                                             (3 Points)                  [ ] Multiple Trauma within 1 month                        (5 Points)  [ ] Lupus anticoagulants                                     (3 Points)                                                           [ ] Anticardiolipin antibodies                               (3 Points)                                                       [ ] High homocysteine in the blood                      (3 Points)                                             [ ] Other congenital or acquired thrombophilia      (3 Points)                                                [ ] Heparin induced thrombocytopenia                  (3 Points)                                     Total Score [    4      ]  OPIOID RISK TOOL    MACHO EACH BOX THAT APPLIES AND ADD TOTALS AT THE END    FAMILY HISTORY OF SUBSTANCE ABUSE                 FEMALE         MALE                                                Alcohol                             [  ]1 pt          [  ]3pts                                               Illegal Durgs                     [  ]2 pts        [  ]3pts                                               Rx Drugs                           [  ]4 pts        [  ]4 pts    PERSONAL HISTORY OF SUBSTANCE ABUSE                                                                                          Alcohol                             [  ]3 pts       [  ]3 pts                                               Illegal Drugs                     [  ]4 pts        [  ]4 pts                                               Rx Drugs                           [  ]5 pts        [  ]5 pts    AGE BETWEEN 16-45 YEARS                                      [  ]1 pt         [  ]1 pt    HISTORY OF PREADOLESCENT   SEXUAL ABUSE                                                             [  ]3 pts        [  ]0pts    PSYCHOLOGICAL DISEASE                     ADD, OCD, Bipolar, Schizophrenia        [  ]2 pts         [  ]2 pts                      Depression                                               [  ]1 pt           [  ]1 pt           SCORING TOTAL   (add numbers and type here)              (**0*)                                     A score of 3 or lower indicated LOW risk for future opioid abuse  A score of 4 to 7 indicated moderate risk for future opioid abuse  A score of 8 or higher indicates a high risk for opioid abuse     Pt is a 59 years old male seen today pre-op for L2-L3 Lumbar laminectomy and posterior spinal fusion with interbody. Pt scheduled for this surgery on 10/3/24 with Dr. Jamal Mcdaniel. Surgery protocol reviewed with Pt today. Pt to follow up with PCP, cardiologist for evaluation and clearance prior to this surgery. Pt instructed on the last dose of Jardiance 24       Patient instructed on NPO protocol   Patient instructed to stop NSAID, all vitamins supplement, Fish oil, COQ 10,  herbals 5 days before this surgery.   Pt okay to take Tylenol as needed for pain   Patient will continue to take all his medications as prescribed    Patient instructed on infection prevention   Chlorhexidine scrub instructions provided    CAPRINI VTE 2.0 SCORE [CLOT updated 2019]    AGE RELATED RISK FACTORS                                                       MOBILITY RELATED FACTORS  [x ] Age 41-60 years                                            (1 Point)                    [ ] Bed rest                                                        (1 Point)  [ ] Age: 61-74 years                                           (2 Points)                  [ ] Plaster cast                                                   (2 Points)  [ ] Age= 75 years                                              (3 Points)                    [ ] Bed bound for more than 72 hours                 (2 Points)    DISEASE RELATED RISK FACTORS                                               GENDER SPECIFIC FACTORS  [ ] Edema in the lower extremities                       (1 Point)              [ ] Pregnancy                                                     (1 Point)  [ ] Varicose veins                                               (1 Point)                     [ ] Post-partum < 6 weeks                                   (1 Point)             [x ] BMI > 25 Kg/m2                                            (1 Point)                     [ ] Hormonal therapy  or oral contraception          (1 Point)                 [ ] Sepsis (in the previous month)                        (1 Point)               [ ] History of pregnancy complications                 (1 point)  [ ] Pneumonia or serious lung disease                                               [ ] Unexplained or recurrent                     (1 Point)           (in the previous month)                               (1 Point)  [ ] Abnormal pulmonary function test                     (1 Point)                 SURGERY RELATED RISK FACTORS  [ ] Acute myocardial infarction                              (1 Point)               [ ]  Section                                             (1 Point)  [ ] Congestive heart failure (in the previous month)  (1 Point)      [ ] Minor surgery                                                  (1 Point)   [ ] Inflammatory bowel disease                             (1 Point)               [ ] Arthroscopic surgery                                        (2 Points)  [ ] Central venous access                                      (2 Points)                [x ] General surgery lasting more than 45 minutes (2 points)  [ ] Malignancy- Present or previous                   (2 Points)                [ ] Elective arthroplasty                                         (5 points)    [ ] Stroke (in the previous month)                          (5 Points)                                                                                                                                                           HEMATOLOGY RELATED FACTORS                                                 TRAUMA RELATED RISK FACTORS  [ ] Prior episodes of VTE                                     (3 Points)                [ ] Fracture of the hip, pelvis, or leg                       (5 Points)  [ ] Positive family history for VTE                         (3 Points)             [ ] Acute spinal cord injury (in the previous month)  (5 Points)  [ ] Prothrombin 16402 A                                     (3 Points)               [ ] Paralysis  (less than 1 month)                             (5 Points)  [ ] Factor V Leiden                                             (3 Points)                  [ ] Multiple Trauma within 1 month                        (5 Points)  [ ] Lupus anticoagulants                                     (3 Points)                                                           [ ] Anticardiolipin antibodies                               (3 Points)                                                       [ ] High homocysteine in the blood                      (3 Points)                                             [ ] Other congenital or acquired thrombophilia      (3 Points)                                                [ ] Heparin induced thrombocytopenia                  (3 Points)                                     Total Score [    4      ]  OPIOID RISK TOOL    MACHO EACH BOX THAT APPLIES AND ADD TOTALS AT THE END    FAMILY HISTORY OF SUBSTANCE ABUSE                 FEMALE         MALE                                                Alcohol                             [  ]1 pt          [  ]3pts                                               Illegal Durgs                     [  ]2 pts        [  ]3pts                                               Rx Drugs                           [  ]4 pts        [  ]4 pts    PERSONAL HISTORY OF SUBSTANCE ABUSE                                                                                          Alcohol                             [  ]3 pts       [  ]3 pts                                               Illegal Drugs                     [  ]4 pts        [  ]4 pts                                               Rx Drugs                           [  ]5 pts        [  ]5 pts    AGE BETWEEN 16-45 YEARS                                      [  ]1 pt         [  ]1 pt    HISTORY OF PREADOLESCENT   SEXUAL ABUSE                                                             [  ]3 pts        [  ]0pts    PSYCHOLOGICAL DISEASE                     ADD, OCD, Bipolar, Schizophrenia        [  ]2 pts         [  ]2 pts                      Depression                                               [  ]1 pt           [  ]1 pt           SCORING TOTAL   (add numbers and type here)              (**0*)                                     A score of 3 or lower indicated LOW risk for future opioid abuse  A score of 4 to 7 indicated moderate risk for future opioid abuse  A score of 8 or higher indicates a high risk for opioid abuse

## 2024-09-12 NOTE — H&P PST ADULT - NSICDXPASTMEDICALHX_GEN_ALL_CORE_FT
PAST MEDICAL HISTORY:  Hypertension     Lumbar herniated disc     Lymphedema of leg      PAST MEDICAL HISTORY:  Disc displacement, lumbar     Heart valve disorder     Hypertension     Lumbar herniated disc     Lumbar stenosis     Lymphedema of leg     ESPINOZA on CPAP

## 2024-09-12 NOTE — CHART NOTE - NSCHARTNOTEFT_GEN_A_CORE
Search Terms: dago estes, 1965Search Date: 09/12/2024 16:20:40 PM  The Drug Utilization Report below displays all of the controlled substance prescriptions, if any, that your patient has filled in the last twelve months. The information displayed on this report is compiled from pharmacy submissions to the Department, and accurately reflects the information as submitted by the pharmacies.     Reference #: [id number]    You have not added a TRUDY number. Keeping your TRUDY number(s) up to date on the My TRUDY # tab will enable the separation of your prescriptions from others in the search results.    Practitioner Count: 2  Pharmacy Count: 1  Current Opioid Prescriptions: 1  Current Benzodiazepine Prescriptions: 1  Current Stimulant Prescriptions: 0      Patient Demographic Information (PDI)       PDI	First Name	Last Name	Birth Date	Gender	Street Address	[facility]	Zip Code  ADRIANA Estes	1965	Male	[address]	[address]    Prescription Information      PDI Filter:    PDI	Current Rx	Drug Type	Rx Written	Rx Dispensed	Drug	Quantity	Days Supply	Prescriber Name	Prescriber TRUDY #	Payment Method	Dispenser  A	Y	O	08/14/2024	08/14/2024	oxycodone hcl (ir) 10 mg tab	120	30	Jonas Berry	[id number]	Insurance	[id number]  A	N		08/02/2024	08/03/2024	zolpidem tartrate 10 mg tablet	30	30	MonSarabjit cade	[id number]	Insurance	[facility]  A	N	O	07/10/2024	07/17/2024	oxycodone hcl (ir) 10 mg tab	120	30	Jonas Berry	[id number]	Insurance	[facility]  A	N		03/25/2024	07/06/2024	zolpidem tartrate 10 mg tablet	30	30	MonSarabjit cade	[id number]	Insurance	[facility]  A	Y	B	07/03/2024	07/05/2024	alprazolam 1 mg tablet	360	90	Sarabjit Malin	[id number]	Insurance	[facility]  A	N	O	06/14/2024	06/16/2024	oxycodone hcl (ir) 10 mg tab	120	30	Jonas Berry	[id number]	Insurance	[facility]  A	N		03/25/2024	06/11/2024	zolpidem tartrate 10 mg tablet	30	30	Sarabjit Malin	[id number]	Insurance	[facility]  A	N	B	05/21/2024	05/28/2024	diazepam 5 mg tablet	1	1	Noel Arroyo (DO)	[id number]	Insurance	[facility]  A	N	O	05/10/2024	05/10/2024	oxycodone hcl (ir) 10 mg tab	120	30	Jonas Berry	[id number]	Insurance	[facility]  A	N		03/25/2024	05/08/2024	zolpidem tartrate 10 mg tablet	30	30	DeaSarabjit	[id number]	Insurance	[facility]  A	N	O	03/29/2024	04/03/2024	oxycodone hcl (ir) 10 mg tab	120	30	Jonas Berry	[id number]	Insurance	[facility]  A	N	B	03/25/2024	03/26/2024	alprazolam 1 mg tablet	360	90	Sarabjit Malin	[id number]	Insurance	[facility]  A	N		03/25/2024	03/26/2024	zolpidem tartrate 10 mg tablet	30	30	MonSarabjit cade	[id number]	Insurance	[facility]  A	N	O	03/01/2024	03/07/2024	oxycodone hcl (ir) 10 mg tab	120	30	Jonas Berry	[id number]	Insurance	[facility]  A	N		11/13/2023	03/02/2024	zolpidem tartrate 10 mg tablet	30	30	Sarabjit Malin	[id number]	Insurance	[facility]  A	N	O	02/07/2024	02/08/2024	oxycodone hcl (ir) 10 mg tab	120	30	Jonas Berry	[id number]	Insurance	[facility]  A	N		11/13/2023	02/04/2024	zolpidem tartrate 10 mg tablet	30	30	MonSarabjit cade	[id number]	Insurance	[facility]  A	N	O	01/05/2024	01/05/2024	oxycodone hcl (ir) 10 mg tab	120	30	Jonas Berry	[id number]	Insurance	[facility]  A	N		11/13/2023	01/05/2024	zolpidem tartrate 10 mg tablet	30	30	Monalyssia Sarabjit	[id number]	Insurance	[facility]  A	N	O	12/06/2023	12/08/2023	oxycodone hcl (ir) 10 mg tab	120	30	Jonas Berry	[id number]	Insurance	[facility]  A	N		11/13/2023	12/08/2023	zolpidem tartrate 10 mg tablet	30	30	Sarabjit Malin	[id number]	Insurance	[facility]  A	N	B	11/13/2023	11/19/2023	alprazolam 1 mg tablet	360	90	Sarabjit Malin	[id number]	Insurance	[facility]  A	N	O	11/08/2023	11/10/2023	oxycodone hcl (ir) 10 mg tab	120	30	Jonas Berry	[id number]	Insurance	[facility]  A	N		08/18/2023	11/10/2023	zolpidem tartrate 10 mg tablet	30	30	Dea Sarabjit	[id number]	Insurance	[facility]  A	N	O	10/11/2023	10/11/2023	oxycodone hcl (ir) 10 mg tab	120	30	Jamal Jonas	[id number]	Insurance	[facility]  A	N		08/18/2023	10/11/2023	zolpidem tartrate 10 mg tablet	30	30	Dea Sarabjit	[id number]	Insurance	[facility]  A	N	O	09/13/2023	09/15/2023	oxycodone hcl (ir) 10 mg tab	120	30	Jonas Berry	[id number]	Insurance	[facility]  A	N		08/18/2023	09/15/2023	zolpidem tartrate 10 mg tablet	30	30	Dea Sarabjit	[id number]	Insurance	[id number]

## 2024-09-12 NOTE — H&P PST ADULT - HISTORY OF PRESENT ILLNESS
Pt is a 59 years old male seen today pre-op fro L2-L3 Lumbar laminectomy and posterior spinal fusion with interbody. Pt reports difficulty standing straight due to back pain and is experiencing leg oozing secondary to lymphedema. His walking is significantly limited, and he uses a walker for mobility. Has experienced significant progressive worsening of bilateral lower extremity weakness over the past  eight weeks . Pt scheduled for this surgery on 10/3/24 with Dr. Jamal Mcdaniel  Pt is a 59 years old male seen today pre-op fro L2-L3 Lumbar laminectomy and posterior spinal fusion with interbody. Pt reports  longstanding back pain since 2013, reports multiple back surgeries lumbar, cervical and thoracic region surgery was complicated with  infection and hematoma 2021.   Pt difficulty standing straight due to back pain and is experiencing leg oozing secondary to lymphedema. His walking is significantly limited, and he uses a walker for mobility. Has experienced significant progressive worsening of bilateral lower extremity weakness over the past  eight weeks . Pt scheduled for this surgery on 10/3/24 with Dr. Jamal Mcdaniel  Pt is a 59 years old male seen today pre-op fro L2-L3 Lumbar laminectomy and posterior spinal fusion with interbody. Pt reports  longstanding back pain since 2013, reports multiple back surgeries lumbar, cervical and thoracic region. Pt states  in 2021 thoracic spine surgery was complicated with  infection and hematoma and t was in the hospital for months for antibiotics management. Today, Pt reports  difficulty standing straight due to back pain, right leg  lymphedema, difficulty  walking uses a walker for mobility, progressively bilateral lower extremities  weakness over the past  few weeks. Pt medical hx includes ESPINOZA, heart HTN, lymphedema right leg, vascular insufficiency, asthma, BPH, obese BMI 46.7, heart valve disorder( Pt on Jardiance ), Crohn's disease. Pt scheduled for this surgery on 10/3/24 with Dr. Jamal Mcdaniel.

## 2024-09-12 NOTE — H&P PST ADULT - MUSCULOSKELETAL
details… decreased ROM/decreased ROM due to pain/joint swelling/joint erythema/back exam/decreased strength/abnormal gait

## 2024-09-12 NOTE — H&P PST ADULT - OTHER CARE PROVIDERS
Dr. Jordan Whipple [phone] Dr. Jordan Whipple [phone], cardiologist Dr. Hitchcock [phone]

## 2024-09-16 NOTE — PROVIDER CONTACT NOTE (OTHER) - ACTION/TREATMENT ORDERED:
Emailed video of preop spine education session to patient on 9/5. Followed with Telephone review of program, all questions answered. Contact information given.

## 2024-09-25 ENCOUNTER — NON-APPOINTMENT (OUTPATIENT)
Age: 59
End: 2024-09-25

## 2024-09-25 ENCOUNTER — OUTPATIENT (OUTPATIENT)
Dept: OUTPATIENT SERVICES | Facility: HOSPITAL | Age: 59
LOS: 1 days | Discharge: HOME | End: 2024-09-25

## 2024-09-25 DIAGNOSIS — R79.9 ABNORMAL FINDING OF BLOOD CHEMISTRY, UNSPECIFIED: ICD-10-CM

## 2024-09-25 DIAGNOSIS — Z98.890 OTHER SPECIFIED POSTPROCEDURAL STATES: Chronic | ICD-10-CM

## 2024-09-25 DIAGNOSIS — Z98.1 ARTHRODESIS STATUS: Chronic | ICD-10-CM

## 2024-09-26 ENCOUNTER — TRANSCRIPTION ENCOUNTER (OUTPATIENT)
Age: 59
End: 2024-09-26

## 2024-09-26 ENCOUNTER — APPOINTMENT (OUTPATIENT)
Dept: HEMATOLOGY ONCOLOGY | Facility: CLINIC | Age: 59
End: 2024-09-26
Payer: COMMERCIAL

## 2024-09-26 VITALS
DIASTOLIC BLOOD PRESSURE: 83 MMHG | BODY MASS INDEX: 41.75 KG/M2 | WEIGHT: 315 LBS | TEMPERATURE: 97.1 F | OXYGEN SATURATION: 95 % | HEART RATE: 87 BPM | HEIGHT: 73 IN | SYSTOLIC BLOOD PRESSURE: 146 MMHG

## 2024-09-26 DIAGNOSIS — R79.1 ABNORMAL COAGULATION PROFILE: ICD-10-CM

## 2024-09-26 PROBLEM — M48.061 SPINAL STENOSIS, LUMBAR REGION WITHOUT NEUROGENIC CLAUDICATION: Chronic | Status: ACTIVE | Noted: 2024-09-12

## 2024-09-26 PROBLEM — G47.33 OBSTRUCTIVE SLEEP APNEA (ADULT) (PEDIATRIC): Chronic | Status: ACTIVE | Noted: 2024-09-12

## 2024-09-26 PROBLEM — I89.0 LYMPHEDEMA, NOT ELSEWHERE CLASSIFIED: Chronic | Status: ACTIVE | Noted: 2024-09-12

## 2024-09-26 PROBLEM — I10 ESSENTIAL (PRIMARY) HYPERTENSION: Chronic | Status: ACTIVE | Noted: 2024-09-12

## 2024-09-26 PROBLEM — M51.26 OTHER INTERVERTEBRAL DISC DISPLACEMENT, LUMBAR REGION: Chronic | Status: ACTIVE | Noted: 2024-09-12

## 2024-09-26 PROBLEM — I38 ENDOCARDITIS, VALVE UNSPECIFIED: Chronic | Status: ACTIVE | Noted: 2024-09-12

## 2024-09-26 PROCEDURE — 99204 OFFICE O/P NEW MOD 45 MIN: CPT

## 2024-09-26 RX ORDER — EMPAGLIFLOZIN 25 MG/1
TABLET, FILM COATED ORAL
Refills: 0 | Status: ACTIVE | COMMUNITY

## 2024-09-26 RX ORDER — POVIDONE-IODINE 10 %
1 SOLUTION, NON-ORAL TOPICAL ONCE
Refills: 0 | Status: DISCONTINUED | OUTPATIENT
Start: 2024-10-03 | End: 2024-10-03

## 2024-09-27 LAB — APTT BLD: 33.9 SEC

## 2024-09-27 NOTE — ASSESSMENT
[FreeTextEntry1] : Mr. Ruelas is a 59 year old male referred for preoperative clearance for an elevated PTT. He is pending L2-L3 spinal fusion surgery. 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  Per review of d/c summary of 2021 hospitalization, on decompression of T6-T9 - no epidural blood was found.   Repeated testing at [facility] [place] on 9/16/24. PTT was 32 (normal) 9/12/24 PTT 37.2 (24-35.6) 3/25/21 PTT 30.6 3/17/21 PTT 32.9  No prior history of bleeding complications with spinal surgeries.  His repeat PTT x 2 is normal.  9/26/24 PTT is 33.9  He may proceed with surgery from hematology standpoint.

## 2024-09-27 NOTE — HISTORY OF PRESENT ILLNESS
[de-identified] : Mr. Ruelas is a 59 year old male referred for preoperative clearance for an elevated PTT. He is pending L2-L3 spinal fusion surgery.  His medical history is significant for history of spontaneous epidural hematoma, cervical spine fusion, spinal stenosis, HTN, lumbar radiculopathy, herniated discs.    Patient presents for initial visit.  Had multiple previous surgeries within the last 10 years including Cervical fusion (2014 or 2015). First Lumbar fusion in 2011 and last lumbar fusion in March 2021. Had a Thoracic laminectomy within the same period. Spontaneous epidural hematoma in July 2021, leading to temporary paralysis. It was not related to any prior surgery or intervention. Evacuation of that  hematoma at [facility] No history of blood in urine, blood in stool, or easy bruising. No personal or family history of a bleeding disorder. Currently taking Meloxicam as needed and Oxycodone 10 mg as needed, taken daily. Patient is not on antibiotics, aspirin or Plavix.  Repeated testing at [facility] on 9/16/24. PTT was 32 9/12/24 PTT 37.2 (24-35.6) 3/25/21 PTT 30.6 3/17/21 PTT 32.9

## 2024-09-27 NOTE — ASSESSMENT
[FreeTextEntry1] : Mr. Ruelas is a 59 year old male referred for preoperative clearance for an elevated PTT. He is pending L2-L3 spinal fusion surgery. 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  Per review of d/c summary of 2021 hospitalization, on decompression of T6-T9 - no epidural blood was found.   Repeated testing at [facility] on 9/16/24. PTT was 32 (normal) 9/12/24 PTT 37.2 (24-35.6) 3/25/21 PTT 30.6 3/17/21 PTT 32.9  No prior history of bleeding complications with spinal surgeries.  His repeat PTT x 2 is normal.  9/26/24 PTT is 33.9  He may proceed with surgery from hematology standpoint.

## 2024-09-27 NOTE — HISTORY OF PRESENT ILLNESS
[de-identified] : Mr. Ruelas is a 59 year old male referred for preoperative clearance for an elevated PTT. He is pending L2-L3 spinal fusion surgery.  His medical history is significant for history of spontaneous epidural hematoma, cervical spine fusion, spinal stenosis, HTN, lumbar radiculopathy, herniated discs.    Patient presents for initial visit.  Had multiple previous surgeries within the last 10 years including Cervical fusion (2014 or 2015). First Lumbar fusion in 2011 and last lumbar fusion in March 2021. Had a Thoracic laminectomy within the same period. Spontaneous epidural hematoma in July 2021, leading to temporary paralysis. It was not related to any prior surgery or intervention. Evacuation of that  hematoma at [facility] No history of blood in urine, blood in stool, or easy bruising. No personal or family history of a bleeding disorder. Currently taking Meloxicam as needed and Oxycodone 10 mg as needed, taken daily. Patient is not on antibiotics, aspirin or Plavix.  Repeated testing at [facility] on 9/16/24. PTT was 32 9/12/24 PTT 37.2 (24-35.6) 3/25/21 PTT 30.6 3/17/21 PTT 32.9

## 2024-09-27 NOTE — CONSULT LETTER
[Dear  ___] : Dear  [unfilled], [Consult Letter:] : I had the pleasure of evaluating your patient, [unfilled]. [Please see my note below.] : Please see my note below. [Consult Closing:] : Thank you very much for allowing me to participate in the care of this patient.  If you have any questions, please do not hesitate to contact me. [Sincerely,] : Sincerely, [FreeTextEntry3] : Dl Pettit MD Medical Oncology/Hematology [facility] [place], [facility]   [facility] [employer] at [facility]

## 2024-09-27 NOTE — CONSULT LETTER
[Dear  ___] : Dear  [unfilled], [Consult Letter:] : I had the pleasure of evaluating your patient, [unfilled]. [Please see my note below.] : Please see my note below. [Consult Closing:] : Thank you very much for allowing me to participate in the care of this patient.  If you have any questions, please do not hesitate to contact me. [Sincerely,] : Sincerely, [FreeTextEntry3] : Dl Pettit MD Medical Oncology/Hematology [facility] [place], [facility]   [facility] [employer] at [facility]

## 2024-10-02 ENCOUNTER — TRANSCRIPTION ENCOUNTER (OUTPATIENT)
Age: 59
End: 2024-10-02

## 2024-10-03 ENCOUNTER — APPOINTMENT (OUTPATIENT)
Dept: ORTHOPEDIC SURGERY | Facility: HOSPITAL | Age: 59
End: 2024-10-03

## 2024-10-03 ENCOUNTER — INPATIENT (INPATIENT)
Facility: HOSPITAL | Age: 59
LOS: 3 days | Discharge: HOME | DRG: 552 | End: 2024-10-07
Attending: SPECIALIST | Admitting: SPECIALIST
Payer: COMMERCIAL

## 2024-10-03 VITALS
DIASTOLIC BLOOD PRESSURE: 83 MMHG | OXYGEN SATURATION: 97 % | SYSTOLIC BLOOD PRESSURE: 152 MMHG | HEART RATE: 71 BPM | TEMPERATURE: 98 F | RESPIRATION RATE: 16 BRPM

## 2024-10-03 DIAGNOSIS — Z98.1 ARTHRODESIS STATUS: Chronic | ICD-10-CM

## 2024-10-03 DIAGNOSIS — Z98.890 OTHER SPECIFIED POSTPROCEDURAL STATES: Chronic | ICD-10-CM

## 2024-10-03 DIAGNOSIS — M51.26 OTHER INTERVERTEBRAL DISC DISPLACEMENT, LUMBAR REGION: ICD-10-CM

## 2024-10-03 LAB
APTT BLD: 39.4 SEC — HIGH (ref 24.5–35.6)
BLD GP AB SCN SERPL QL: SIGNIFICANT CHANGE UP
INR BLD: 1.05 RATIO — SIGNIFICANT CHANGE UP (ref 0.85–1.16)
PROTHROM AB SERPL-ACNC: 12.2 SEC — SIGNIFICANT CHANGE UP (ref 9.9–13.4)

## 2024-10-03 PROCEDURE — 22830 EXPLORATION OF SPINAL FUSION: CPT | Mod: 80

## 2024-10-03 PROCEDURE — 22612 ARTHRD PST TQ 1NTRSPC LUMBAR: CPT | Mod: 62

## 2024-10-03 PROCEDURE — 22830 EXPLORATION OF SPINAL FUSION: CPT | Mod: 59

## 2024-10-03 PROCEDURE — 63047 LAM FACETEC & FORAMOT LUMBAR: CPT | Mod: 62

## 2024-10-03 PROCEDURE — 22842 INSERT SPINE FIXATION DEVICE: CPT

## 2024-10-03 PROCEDURE — 22842 INSERT SPINE FIXATION DEVICE: CPT | Mod: 80

## 2024-10-03 PROCEDURE — 63048 LAM FACETEC &FORAMOT EA ADDL: CPT | Mod: 62

## 2024-10-03 DEVICE — SURGIFLO MATRIX WITH THROMBIN KIT: Type: IMPLANTABLE DEVICE | Status: FUNCTIONAL

## 2024-10-03 DEVICE — BONE WAX 2.5GM: Type: IMPLANTABLE DEVICE | Status: FUNCTIONAL

## 2024-10-03 DEVICE — IMPLANTABLE DEVICE: Type: IMPLANTABLE DEVICE | Status: FUNCTIONAL

## 2024-10-03 DEVICE — SCREW EVEREST POLY 6.5X50MM: Type: IMPLANTABLE DEVICE | Status: FUNCTIONAL

## 2024-10-03 DEVICE — SURGIFOAM PAD 8CM X 12.5CM X 10MM (100): Type: IMPLANTABLE DEVICE | Status: FUNCTIONAL

## 2024-10-03 DEVICE — SET SCREW EVEREST ATR: Type: IMPLANTABLE DEVICE | Status: FUNCTIONAL

## 2024-10-03 RX ORDER — CEFAZOLIN SODIUM 1 G
2000 VIAL (EA) INJECTION EVERY 8 HOURS
Refills: 0 | Status: DISCONTINUED | OUTPATIENT
Start: 2024-10-03 | End: 2024-10-06

## 2024-10-03 RX ORDER — NALOXONE HYDROCHLORIDE 0.4 MG/ML
0.1 INJECTION, SOLUTION INTRAMUSCULAR; INTRAVENOUS; SUBCUTANEOUS
Refills: 0 | Status: DISCONTINUED | OUTPATIENT
Start: 2024-10-03 | End: 2024-10-07

## 2024-10-03 RX ORDER — POTASSIUM CHLORIDE 10 MEQ
1 TABLET, EXT RELEASE, PARTICLES/CRYSTALS ORAL
Refills: 0 | DISCHARGE

## 2024-10-03 RX ORDER — FOLIC ACID/MULTIVIT,IRON,MINER 0.4MG-18MG
0 TABLET,CHEWABLE ORAL
Refills: 0 | DISCHARGE

## 2024-10-03 RX ORDER — CEFAZOLIN SODIUM 1 G
3000 VIAL (EA) INJECTION ONCE
Refills: 0 | Status: DISCONTINUED | OUTPATIENT
Start: 2024-10-03 | End: 2024-10-03

## 2024-10-03 RX ORDER — GABAPENTIN 800 MG/1
300 TABLET, FILM COATED ORAL THREE TIMES A DAY
Refills: 0 | Status: DISCONTINUED | OUTPATIENT
Start: 2024-10-03 | End: 2024-10-07

## 2024-10-03 RX ORDER — TAMSULOSIN HCL 0.4 MG
0.4 CAPSULE ORAL AT BEDTIME
Refills: 0 | Status: DISCONTINUED | OUTPATIENT
Start: 2024-10-03 | End: 2024-10-07

## 2024-10-03 RX ORDER — MELOXICAM 15 MG/1
0 TABLET ORAL
Refills: 0 | DISCHARGE

## 2024-10-03 RX ORDER — SODIUM CHLORIDE 0.9 % (FLUSH) 0.9 %
3 SYRINGE (ML) INJECTION EVERY 8 HOURS
Refills: 0 | Status: DISCONTINUED | OUTPATIENT
Start: 2024-10-03 | End: 2024-10-03

## 2024-10-03 RX ORDER — FENTANYL CITRATE-0.9 % NACL/PF 300MCG/30
25 PATIENT CONTROLLED ANALGESIA VIAL INJECTION
Refills: 0 | Status: DISCONTINUED | OUTPATIENT
Start: 2024-10-03 | End: 2024-10-07

## 2024-10-03 RX ORDER — HYDROMORPHONE HYDROCHLORIDE 1 MG/ML
30 INJECTION, SOLUTION INTRAMUSCULAR; INTRAVENOUS; SUBCUTANEOUS
Refills: 0 | Status: DISCONTINUED | OUTPATIENT
Start: 2024-10-03 | End: 2024-10-06

## 2024-10-03 RX ORDER — PSYLLIUM HUSK 3.4 G/6.5G
0 GRANULE ORAL
Refills: 0 | DISCHARGE

## 2024-10-03 RX ORDER — FUROSEMIDE 40 MG
1 TABLET ORAL
Refills: 0 | DISCHARGE

## 2024-10-03 RX ORDER — ACETAMINOPHEN 325 MG
650 TABLET ORAL EVERY 6 HOURS
Refills: 0 | Status: DISCONTINUED | OUTPATIENT
Start: 2024-10-03 | End: 2024-10-07

## 2024-10-03 RX ORDER — ALPRAZOLAM 0.5 MG/1
1 TABLET ORAL DAILY
Refills: 0 | Status: DISCONTINUED | OUTPATIENT
Start: 2024-10-03 | End: 2024-10-07

## 2024-10-03 RX ORDER — LOSARTAN POTASSIUM 50 MG/1
1 TABLET ORAL
Refills: 0 | DISCHARGE

## 2024-10-03 RX ORDER — MELOXICAM 15 MG/1
1 TABLET ORAL
Refills: 0 | DISCHARGE

## 2024-10-03 RX ORDER — FINASTERIDE 1 MG/1
1 TABLET, FILM COATED ORAL
Refills: 0 | DISCHARGE

## 2024-10-03 RX ORDER — ACETAMINOPHEN 325 MG
1000 TABLET ORAL ONCE
Refills: 0 | Status: COMPLETED | OUTPATIENT
Start: 2024-10-03 | End: 2024-10-03

## 2024-10-03 RX ORDER — FLUOXETINE HCL 20 MG/5 ML
1 SOLUTION, ORAL ORAL
Refills: 0 | DISCHARGE

## 2024-10-03 RX ORDER — SODIUM CHLORIDE IRRIG SOLUTION 0.9 %
1000 SOLUTION, IRRIGATION IRRIGATION
Refills: 0 | Status: DISCONTINUED | OUTPATIENT
Start: 2024-10-03 | End: 2024-10-03

## 2024-10-03 RX ORDER — FUROSEMIDE 10 MG/ML
40 INJECTION INTRAVENOUS
Refills: 0 | Status: DISCONTINUED | OUTPATIENT
Start: 2024-10-03 | End: 2024-10-07

## 2024-10-03 RX ORDER — OXYCODONE HYDROCHLORIDE 30 MG/1
5 TABLET, FILM COATED, EXTENDED RELEASE ORAL EVERY 4 HOURS
Refills: 0 | Status: DISCONTINUED | OUTPATIENT
Start: 2024-10-03 | End: 2024-10-03

## 2024-10-03 RX ORDER — CELECOXIB 200 MG/1
400 CAPSULE ORAL ONCE
Refills: 0 | Status: COMPLETED | OUTPATIENT
Start: 2024-10-03 | End: 2024-10-03

## 2024-10-03 RX ORDER — OMEPRAZOLE 40 MG/1
1 CAPSULE, DELAYED RELEASE ORAL
Refills: 0 | DISCHARGE

## 2024-10-03 RX ORDER — ZOLPIDEM TARTRATE 5 MG/1
1 TABLET, FILM COATED ORAL
Refills: 0 | DISCHARGE

## 2024-10-03 RX ORDER — SODIUM CHLORIDE IRRIG SOLUTION 0.9 %
1000 SOLUTION, IRRIGATION IRRIGATION
Refills: 0 | Status: DISCONTINUED | OUTPATIENT
Start: 2024-10-03 | End: 2024-10-04

## 2024-10-03 RX ORDER — SENNOSIDES 8.6 MG
2 TABLET ORAL AT BEDTIME
Refills: 0 | Status: DISCONTINUED | OUTPATIENT
Start: 2024-10-03 | End: 2024-10-07

## 2024-10-03 RX ORDER — ALPRAZOLAM 0.25 MG
1 TABLET ORAL
Refills: 0 | DISCHARGE

## 2024-10-03 RX ORDER — GABAPENTIN 100 MG
0 CAPSULE ORAL
Refills: 0 | DISCHARGE

## 2024-10-03 RX ORDER — OXYCODONE HYDROCHLORIDE 5 MG/1
1 TABLET ORAL
Refills: 0 | DISCHARGE

## 2024-10-03 RX ORDER — TAMSULOSIN HYDROCHLORIDE 0.4 MG/1
1 CAPSULE ORAL
Refills: 0 | DISCHARGE

## 2024-10-03 RX ORDER — HYDROMORPHONE HYDROCHLORIDE 1 MG/ML
0.5 INJECTION, SOLUTION INTRAMUSCULAR; INTRAVENOUS; SUBCUTANEOUS
Refills: 0 | Status: DISCONTINUED | OUTPATIENT
Start: 2024-10-03 | End: 2024-10-03

## 2024-10-03 RX ORDER — MONTELUKAST SODIUM 5 MG/1
0 TABLET, CHEWABLE ORAL
Refills: 0 | DISCHARGE

## 2024-10-03 RX ORDER — OXYCODONE HYDROCHLORIDE 30 MG/1
10 TABLET, FILM COATED, EXTENDED RELEASE ORAL EVERY 4 HOURS
Refills: 0 | Status: DISCONTINUED | OUTPATIENT
Start: 2024-10-03 | End: 2024-10-03

## 2024-10-03 RX ORDER — HYDROMORPHONE HYDROCHLORIDE 1 MG/ML
0.5 INJECTION, SOLUTION INTRAMUSCULAR; INTRAVENOUS; SUBCUTANEOUS EVERY 4 HOURS
Refills: 0 | Status: DISCONTINUED | OUTPATIENT
Start: 2024-10-03 | End: 2024-10-03

## 2024-10-03 RX ORDER — LOSARTAN POTASSIUM 100 MG/1
25 TABLET, FILM COATED ORAL DAILY
Refills: 0 | Status: DISCONTINUED | OUTPATIENT
Start: 2024-10-03 | End: 2024-10-07

## 2024-10-03 RX ORDER — USTEKINUMAB 45 MG/.5ML
45 INJECTION, SOLUTION SUBCUTANEOUS
Refills: 0 | DISCHARGE

## 2024-10-03 RX ORDER — ZOLPIDEM TARTRATE 5 MG
5 TABLET ORAL AT BEDTIME
Refills: 0 | Status: DISCONTINUED | OUTPATIENT
Start: 2024-10-03 | End: 2024-10-07

## 2024-10-03 RX ORDER — ARIPIPRAZOLE 15 MG/1
1 TABLET ORAL
Refills: 0 | DISCHARGE

## 2024-10-03 RX ORDER — FENTANYL CITRATE-0.9 % NACL/PF 300MCG/30
25 PATIENT CONTROLLED ANALGESIA VIAL INJECTION
Refills: 0 | Status: DISCONTINUED | OUTPATIENT
Start: 2024-10-03 | End: 2024-10-03

## 2024-10-03 RX ORDER — APREPITANT 125MG-80MG
40 KIT ORAL ONCE
Refills: 0 | Status: COMPLETED | OUTPATIENT
Start: 2024-10-03 | End: 2024-10-03

## 2024-10-03 RX ORDER — FINASTERIDE 5 MG/1
5 TABLET, FILM COATED ORAL DAILY
Refills: 0 | Status: DISCONTINUED | OUTPATIENT
Start: 2024-10-03 | End: 2024-10-07

## 2024-10-03 RX ORDER — CEFAZOLIN SODIUM 1 G
2000 VIAL (EA) INJECTION EVERY 8 HOURS
Refills: 0 | Status: DISCONTINUED | OUTPATIENT
Start: 2024-10-03 | End: 2024-10-03

## 2024-10-03 RX ORDER — INFLUENZA VIRUS VACCINE 15; 15; 15; 15 UG/.5ML; UG/.5ML; UG/.5ML; UG/.5ML
0.5 SUSPENSION INTRAMUSCULAR ONCE
Refills: 0 | Status: DISCONTINUED | OUTPATIENT
Start: 2024-10-03 | End: 2024-10-07

## 2024-10-03 RX ORDER — MONTELUKAST SODIUM 10 MG/1
10 TABLET, FILM COATED ORAL DAILY
Refills: 0 | Status: DISCONTINUED | OUTPATIENT
Start: 2024-10-03 | End: 2024-10-07

## 2024-10-03 RX ORDER — PANTOPRAZOLE SODIUM 40 MG/1
40 TABLET, DELAYED RELEASE ORAL
Refills: 0 | Status: DISCONTINUED | OUTPATIENT
Start: 2024-10-03 | End: 2024-10-07

## 2024-10-03 RX ORDER — ONDANSETRON HCL/PF 4 MG/2 ML
4 VIAL (ML) INJECTION EVERY 6 HOURS
Refills: 0 | Status: DISCONTINUED | OUTPATIENT
Start: 2024-10-03 | End: 2024-10-07

## 2024-10-03 RX ORDER — HYDROMORPHONE HYDROCHLORIDE 1 MG/ML
2 INJECTION, SOLUTION INTRAMUSCULAR; INTRAVENOUS; SUBCUTANEOUS EVERY 4 HOURS
Refills: 0 | Status: DISCONTINUED | OUTPATIENT
Start: 2024-10-03 | End: 2024-10-03

## 2024-10-03 RX ORDER — FLUOXETINE HCL 20 MG
40 CAPSULE ORAL DAILY
Refills: 0 | Status: DISCONTINUED | OUTPATIENT
Start: 2024-10-03 | End: 2024-10-07

## 2024-10-03 RX ORDER — EMPAGLIFLOZIN 10 MG/1
1 TABLET, FILM COATED ORAL
Refills: 0 | DISCHARGE

## 2024-10-03 RX ORDER — ACETAMINOPHEN 325 MG
975 TABLET ORAL ONCE
Refills: 0 | Status: COMPLETED | OUTPATIENT
Start: 2024-10-03 | End: 2024-10-03

## 2024-10-03 RX ORDER — ARIPIPRAZOLE 5 MG/1
5 TABLET ORAL DAILY
Refills: 0 | Status: DISCONTINUED | OUTPATIENT
Start: 2024-10-03 | End: 2024-10-07

## 2024-10-03 RX ORDER — ONDANSETRON HCL/PF 4 MG/2 ML
4 VIAL (ML) INJECTION EVERY 8 HOURS
Refills: 0 | Status: DISCONTINUED | OUTPATIENT
Start: 2024-10-03 | End: 2024-10-07

## 2024-10-03 RX ADMIN — Medication 2 TABLET(S): at 21:22

## 2024-10-03 RX ADMIN — Medication 75 MILLILITER(S): at 21:27

## 2024-10-03 RX ADMIN — HYDROMORPHONE HYDROCHLORIDE 30 MILLILITER(S): 1 INJECTION, SOLUTION INTRAMUSCULAR; INTRAVENOUS; SUBCUTANEOUS at 17:53

## 2024-10-03 RX ADMIN — CELECOXIB 400 MILLIGRAM(S): 200 CAPSULE ORAL at 10:37

## 2024-10-03 RX ADMIN — Medication 650 MILLIGRAM(S): at 23:16

## 2024-10-03 RX ADMIN — Medication 975 MILLIGRAM(S): at 10:37

## 2024-10-03 RX ADMIN — GABAPENTIN 300 MILLIGRAM(S): 800 TABLET, FILM COATED ORAL at 21:23

## 2024-10-03 RX ADMIN — Medication 1000 MILLIGRAM(S): at 18:00

## 2024-10-03 RX ADMIN — HYDROMORPHONE HYDROCHLORIDE 30 MILLILITER(S): 1 INJECTION, SOLUTION INTRAMUSCULAR; INTRAVENOUS; SUBCUTANEOUS at 19:42

## 2024-10-03 RX ADMIN — Medication 2000 MILLIGRAM(S): at 21:22

## 2024-10-03 RX ADMIN — Medication 400 MILLIGRAM(S): at 17:30

## 2024-10-03 RX ADMIN — HYDROMORPHONE HYDROCHLORIDE 0.5 MILLIGRAM(S): 1 INJECTION, SOLUTION INTRAMUSCULAR; INTRAVENOUS; SUBCUTANEOUS at 18:00

## 2024-10-03 RX ADMIN — HYDROMORPHONE HYDROCHLORIDE 0.5 MILLIGRAM(S): 1 INJECTION, SOLUTION INTRAMUSCULAR; INTRAVENOUS; SUBCUTANEOUS at 17:30

## 2024-10-03 RX ADMIN — HYDROMORPHONE HYDROCHLORIDE 30 MILLILITER(S): 1 INJECTION, SOLUTION INTRAMUSCULAR; INTRAVENOUS; SUBCUTANEOUS at 20:08

## 2024-10-03 RX ADMIN — APREPITANT 40 MILLIGRAM(S): KIT at 10:37

## 2024-10-03 RX ADMIN — Medication 0.4 MILLIGRAM(S): at 21:23

## 2024-10-03 RX ADMIN — HYDROMORPHONE HYDROCHLORIDE 0.5 MILLIGRAM(S): 1 INJECTION, SOLUTION INTRAMUSCULAR; INTRAVENOUS; SUBCUTANEOUS at 17:15

## 2024-10-03 NOTE — PATIENT PROFILE ADULT - FALL HARM RISK - HARM RISK INTERVENTIONS
Assistance with ambulation/Assistance OOB with selected safe patient handling equipment/Communicate Risk of Fall with Harm to all staff/Discuss with provider need for PT consult/Monitor gait and stability/Provide patient with walking aids - walker, cane, crutches/Reinforce activity limits and safety measures with patient and family/Sit up slowly, dangle for a short time, stand at bedside before walking/Tailored Fall Risk Interventions/Use of alarms - bed, chair and/or voice tab/Visual Cue: Yellow wristband and red socks/Bed in lowest position, wheels locked, appropriate side rails in place/Call bell, personal items and telephone in reach/Instruct patient to call for assistance before getting out of bed or chair/Non-slip footwear when patient is out of bed/[place] to call system/Physically safe environment - no spills, clutter or unnecessary equipment/Purposeful Proactive Rounding/Room/bathroom lighting operational, light cord in reach

## 2024-10-03 NOTE — BRIEF OPERATIVE NOTE - OPERATION/FINDINGS
L2/3 laminectomy  L2/3 posterior spine fusion  L2-5 posterior instrumentation.  local autograft    L2/3 stenosis; Prior fusion L3-5

## 2024-10-03 NOTE — PATIENT PROFILE ADULT - ARE SIGNIFICANT INDICATORS COMPLETE.
Patient wants a return call for a update on the from she dropped off on Monday.   Please give her a call @ [phone] Yes

## 2024-10-03 NOTE — PHYSICAL THERAPY INITIAL EVALUATION ADULT - GENERAL OBSERVATIONS, REHAB EVAL
Pt received in bed, + IV + tele//BP + PCA pump + SCDs + hemovac drain + NANO drain, breathing on RA in NAD, in 6/10 lumbar spine pain, agreeable to PT evaluation

## 2024-10-04 LAB
ALBUMIN SERPL ELPH-MCNC: 3.2 G/DL — LOW (ref 3.3–5.2)
ALP SERPL-CCNC: 49 U/L — SIGNIFICANT CHANGE UP (ref 40–120)
ALT FLD-CCNC: 16 U/L — SIGNIFICANT CHANGE UP
ANION GAP SERPL CALC-SCNC: 13 MMOL/L — SIGNIFICANT CHANGE UP (ref 5–17)
ANION GAP SERPL CALC-SCNC: 7 MMOL/L — SIGNIFICANT CHANGE UP (ref 5–17)
APPEARANCE UR: CLEAR — SIGNIFICANT CHANGE UP
AST SERPL-CCNC: 16 U/L — SIGNIFICANT CHANGE UP
BILIRUB SERPL-MCNC: 0.6 MG/DL — SIGNIFICANT CHANGE UP (ref 0.4–2)
BILIRUB UR-MCNC: NEGATIVE — SIGNIFICANT CHANGE UP
BUN SERPL-MCNC: 11 MG/DL — SIGNIFICANT CHANGE UP (ref 8–20)
BUN SERPL-MCNC: 13.9 MG/DL — SIGNIFICANT CHANGE UP (ref 8–20)
CALCIUM SERPL-MCNC: 7.9 MG/DL — LOW (ref 8.4–10.5)
CALCIUM SERPL-MCNC: 8.2 MG/DL — LOW (ref 8.4–10.5)
CHLORIDE SERPL-SCNC: 89 MMOL/L — LOW (ref 96–108)
CHLORIDE SERPL-SCNC: 89 MMOL/L — LOW (ref 96–108)
CO2 SERPL-SCNC: 28 MMOL/L — SIGNIFICANT CHANGE UP (ref 22–29)
CO2 SERPL-SCNC: 31 MMOL/L — HIGH (ref 22–29)
COLOR SPEC: YELLOW — SIGNIFICANT CHANGE UP
CREAT ?TM UR-MCNC: 40 MG/DL — SIGNIFICANT CHANGE UP
CREAT SERPL-MCNC: 0.57 MG/DL — SIGNIFICANT CHANGE UP (ref 0.5–1.3)
CREAT SERPL-MCNC: 0.57 MG/DL — SIGNIFICANT CHANGE UP (ref 0.5–1.3)
DIFF PNL FLD: NEGATIVE — SIGNIFICANT CHANGE UP
EGFR: 112 ML/MIN/1.73M2 — SIGNIFICANT CHANGE UP
EGFR: 112 ML/MIN/1.73M2 — SIGNIFICANT CHANGE UP
GLUCOSE SERPL-MCNC: 106 MG/DL — HIGH (ref 70–99)
GLUCOSE SERPL-MCNC: 98 MG/DL — SIGNIFICANT CHANGE UP (ref 70–99)
GLUCOSE UR QL: NEGATIVE MG/DL — SIGNIFICANT CHANGE UP
HCT VFR BLD CALC: 32.8 % — LOW (ref 39–50)
HGB BLD-MCNC: 11.6 G/DL — LOW (ref 13–17)
KETONES UR-MCNC: NEGATIVE MG/DL — SIGNIFICANT CHANGE UP
LEUKOCYTE ESTERASE UR-ACNC: NEGATIVE — SIGNIFICANT CHANGE UP
MAGNESIUM SERPL-MCNC: 1.7 MG/DL — SIGNIFICANT CHANGE UP (ref 1.6–2.6)
MCHC RBC-ENTMCNC: 30.5 PG — SIGNIFICANT CHANGE UP (ref 27–34)
MCHC RBC-ENTMCNC: 35.4 GM/DL — SIGNIFICANT CHANGE UP (ref 32–36)
MCV RBC AUTO: 86.3 FL — SIGNIFICANT CHANGE UP (ref 80–100)
NITRITE UR-MCNC: NEGATIVE — SIGNIFICANT CHANGE UP
OSMOLALITY SERPL: 261 MOSMOL/KG — LOW (ref 280–301)
OSMOLALITY UR: 262 MOSM/KG — LOW (ref 300–1000)
PH UR: 7.5 — SIGNIFICANT CHANGE UP (ref 5–8)
PHOSPHATE SERPL-MCNC: 2 MG/DL — LOW (ref 2.4–4.7)
PLATELET # BLD AUTO: 136 K/UL — LOW (ref 150–400)
POTASSIUM SERPL-MCNC: 2.8 MMOL/L — CRITICAL LOW (ref 3.5–5.3)
POTASSIUM SERPL-MCNC: 2.8 MMOL/L — CRITICAL LOW (ref 3.5–5.3)
POTASSIUM SERPL-SCNC: 2.8 MMOL/L — CRITICAL LOW (ref 3.5–5.3)
POTASSIUM SERPL-SCNC: 2.8 MMOL/L — CRITICAL LOW (ref 3.5–5.3)
POTASSIUM UR-SCNC: 17 MMOL/L — SIGNIFICANT CHANGE UP
PROT ?TM UR-MCNC: 4 MG/DL — SIGNIFICANT CHANGE UP (ref 0–12)
PROT SERPL-MCNC: 5.5 G/DL — LOW (ref 6.6–8.7)
PROT UR-MCNC: NEGATIVE MG/DL — SIGNIFICANT CHANGE UP
PROT/CREAT UR-RTO: 0.1 RATIO — SIGNIFICANT CHANGE UP
RBC # BLD: 3.8 M/UL — LOW (ref 4.2–5.8)
RBC # FLD: 13 % — SIGNIFICANT CHANGE UP (ref 10.3–14.5)
SODIUM SERPL-SCNC: 127 MMOL/L — LOW (ref 135–145)
SODIUM SERPL-SCNC: 130 MMOL/L — LOW (ref 135–145)
SODIUM UR-SCNC: 31 MMOL/L — SIGNIFICANT CHANGE UP
SP GR SPEC: 1.01 — SIGNIFICANT CHANGE UP (ref 1–1.03)
TSH SERPL-MCNC: 1.77 UIU/ML — SIGNIFICANT CHANGE UP (ref 0.27–4.2)
UROBILINOGEN FLD QL: 1 MG/DL — SIGNIFICANT CHANGE UP (ref 0.2–1)
UUN UR-MCNC: 364 MG/DL — SIGNIFICANT CHANGE UP
WBC # BLD: 8.27 K/UL — SIGNIFICANT CHANGE UP (ref 3.8–10.5)
WBC # FLD AUTO: 8.27 K/UL — SIGNIFICANT CHANGE UP (ref 3.8–10.5)

## 2024-10-04 PROCEDURE — 99223 1ST HOSP IP/OBS HIGH 75: CPT

## 2024-10-04 RX ORDER — MAGNESIUM SULFATE 500 MG/ML
1 VIAL (ML) INJECTION ONCE
Refills: 0 | Status: COMPLETED | OUTPATIENT
Start: 2024-10-04 | End: 2024-10-04

## 2024-10-04 RX ORDER — SODIUM CHLORIDE IRRIG SOLUTION 0.9 %
500 SOLUTION, IRRIGATION IRRIGATION
Refills: 0 | Status: DISCONTINUED | OUTPATIENT
Start: 2024-10-04 | End: 2024-10-07

## 2024-10-04 RX ORDER — SOD PHOS DI, MONO/K PHOS MONO 250 MG
1 TABLET ORAL
Refills: 0 | Status: COMPLETED | OUTPATIENT
Start: 2024-10-04 | End: 2024-10-05

## 2024-10-04 RX ADMIN — FUROSEMIDE 40 MILLIGRAM(S): 10 INJECTION INTRAVENOUS at 13:50

## 2024-10-04 RX ADMIN — Medication 100 MILLIEQUIVALENT(S): at 12:34

## 2024-10-04 RX ADMIN — Medication 0.4 MILLIGRAM(S): at 21:20

## 2024-10-04 RX ADMIN — Medication 100 GRAM(S): at 15:55

## 2024-10-04 RX ADMIN — Medication 1 PACKET(S): at 17:35

## 2024-10-04 RX ADMIN — Medication 20 MILLIEQUIVALENT(S): at 15:55

## 2024-10-04 RX ADMIN — GABAPENTIN 300 MILLIGRAM(S): 800 TABLET, FILM COATED ORAL at 13:50

## 2024-10-04 RX ADMIN — Medication 1 PACKET(S): at 15:55

## 2024-10-04 RX ADMIN — FINASTERIDE 5 MILLIGRAM(S): 5 TABLET, FILM COATED ORAL at 12:33

## 2024-10-04 RX ADMIN — Medication 2000 MILLIGRAM(S): at 13:51

## 2024-10-04 RX ADMIN — FUROSEMIDE 40 MILLIGRAM(S): 10 INJECTION INTRAVENOUS at 05:15

## 2024-10-04 RX ADMIN — ALPRAZOLAM 1 MILLIGRAM(S): 0.5 TABLET ORAL at 06:07

## 2024-10-04 RX ADMIN — PANTOPRAZOLE SODIUM 40 MILLIGRAM(S): 40 TABLET, DELAYED RELEASE ORAL at 05:15

## 2024-10-04 RX ADMIN — Medication 650 MILLIGRAM(S): at 17:33

## 2024-10-04 RX ADMIN — LOSARTAN POTASSIUM 25 MILLIGRAM(S): 100 TABLET, FILM COATED ORAL at 05:15

## 2024-10-04 RX ADMIN — Medication 40 MILLIEQUIVALENT(S): at 12:34

## 2024-10-04 RX ADMIN — Medication 650 MILLIGRAM(S): at 06:09

## 2024-10-04 RX ADMIN — Medication 650 MILLIGRAM(S): at 13:03

## 2024-10-04 RX ADMIN — GABAPENTIN 300 MILLIGRAM(S): 800 TABLET, FILM COATED ORAL at 05:15

## 2024-10-04 RX ADMIN — Medication 40 MILLIGRAM(S): at 12:33

## 2024-10-04 RX ADMIN — Medication 2000 MILLIGRAM(S): at 05:14

## 2024-10-04 RX ADMIN — Medication 10 MILLIEQUIVALENT(S): at 13:51

## 2024-10-04 RX ADMIN — Medication 650 MILLIGRAM(S): at 05:15

## 2024-10-04 RX ADMIN — Medication 650 MILLIGRAM(S): at 00:00

## 2024-10-04 RX ADMIN — Medication 2000 MILLIGRAM(S): at 21:19

## 2024-10-04 RX ADMIN — Medication 2 TABLET(S): at 21:20

## 2024-10-04 RX ADMIN — HYDROMORPHONE HYDROCHLORIDE 30 MILLILITER(S): 1 INJECTION, SOLUTION INTRAMUSCULAR; INTRAVENOUS; SUBCUTANEOUS at 19:16

## 2024-10-04 RX ADMIN — Medication 100 MILLIEQUIVALENT(S): at 13:52

## 2024-10-04 RX ADMIN — Medication 1 PACKET(S): at 23:09

## 2024-10-04 RX ADMIN — Medication 650 MILLIGRAM(S): at 18:03

## 2024-10-04 RX ADMIN — Medication 650 MILLIGRAM(S): at 12:33

## 2024-10-04 RX ADMIN — Medication 650 MILLIGRAM(S): at 23:09

## 2024-10-04 RX ADMIN — GABAPENTIN 300 MILLIGRAM(S): 800 TABLET, FILM COATED ORAL at 21:20

## 2024-10-04 RX ADMIN — ARIPIPRAZOLE 5 MILLIGRAM(S): 5 TABLET ORAL at 12:33

## 2024-10-04 RX ADMIN — MONTELUKAST SODIUM 10 MILLIGRAM(S): 10 TABLET, FILM COATED ORAL at 12:33

## 2024-10-04 NOTE — OCCUPATIONAL THERAPY INITIAL EVALUATION ADULT - ADDITIONAL COMMENTS
Pt has a shower with curtains and no grab bars. Independent prior to admission with use of RW secondary pain/LE weakness. Owns cane and shower chair. Pt is R handed and does not drive.

## 2024-10-04 NOTE — OCCUPATIONAL THERAPY INITIAL EVALUATION ADULT - GENERAL OBSERVATIONS, REHAB EVAL
Left pt as received OOB in chair, NAD, +IV, +PCA, +Tele/, +NANO, +Hemovac on RA A&Ox4. Pre/post pain 6/10, back; RN aware. Pt agreeable to OT evaluation

## 2024-10-04 NOTE — PROVIDER CONTACT NOTE (CRITICAL VALUE NOTIFICATION) - ACTION/TREATMENT ORDERED:
[FreeTextEntry1] : 53M Patients symptoms have improved since initial evaluation, Hemorrhoids are smaller on examination today than on prior evaluations.\par Findings: Enlarged left lateral, right posterior hemorrhoids. \par Rubber band ligation performed on each hemorrhoid column. A total of 2 bands placed\par \par Plan of care for Hemorrhoids\par Continue with daily fiber supplementation to diet, Metamucil, Benefiber, or fiber supplement of preference\par Continue daily over the counter stool softener (eg.Colace) to prevent straining\par Continue increased fluid intake, preferably water\par Refrain from straining or lingering (eg. reading) on the toilet\par Post band ligation instruction given, if worsening pain, fevers, or trouble urinating, patient to call office to arrange for urgent re-evaluation.\par Follow up in 4 weeks for evaluation. If patient remains asymptomatic no further banding will be required. We discussed that if symptoms return he may need an examination under anesthesia and hemorrhoidectomy.\par \par \par \par \par \par  as per Rick CHAVEZ will reach out to medicine team

## 2024-10-04 NOTE — CONSULT NOTE ADULT - SUBJECTIVE AND OBJECTIVE BOX
Patient is a 59y old  Male who is s/p Lumbar laminectoimy , POD#1 . Pain well controlled with PCA, denies n/v, voiding , tolerating diet     CC: CHRONIC LUMBAR PAIN     HPI:  Pt is a 59 years old male. Pt reports  longstanding back pain since 2013, reports multiple back surgeries lumbar, cervical and thoracic region. Pt states  in 2021 thoracic spine surgery was complicated with  infection and hematoma and t was in the hospital for months for antibiotics management. Today, Pt reports  difficulty standing straight due to back pain, right leg  lymphedema, difficulty  walking uses a walker for mobility, progressively bilateral lower extremities  weakness over the past  few weeks. Pt medical hx includes ESPINOZA, heart HTN, lymphedema right leg, vascular insufficiency, asthma, BPH, obese , heart valve disorder( Pt on Jardiance ), Crohn's disease.       PAST MEDICAL & SURGICAL HISTORY:  Lumbar herniated disc      Hypertension      Lymphedema of leg      ESPINOZA on CPAP    BPH       Heart valve disorder      Disc displacement, lumbar      Lumbar stenosis      History of laminectomy      History of spinal fusion    Obese :           Social History:  Tobacco: ex smoker , quit 10 yrs ago    Illicit drug abuse - denies    FAMILY HISTORY:  FH: dementia (Mother)        Allergies    No Known Allergies    Intolerances        HOME MEDICATIONS :     Ambien 10 mg oral tablet: 1 tab(s) orally once a day (at bedtime) (03 Oct 2024 10:35)  ARIPiprazole 5 mg oral tablet: 1 tab(s) orally (03 Oct 2024 10:36)  cholecalciferol:  (03 Oct 2024 10:36)  finasteride 5 mg oral tablet: 1 tab(s) orally (03 Oct 2024 10:36)  FLUoxetine 40 mg oral capsule: 1 cap(s) orally (03 Oct 2024 10:35)  furosemide 40 mg oral tablet: 1 tab(s) orally 2 times a day (03 Oct 2024 10:36)  gabapentin 300 mg oral tablet: orally 3 times a day (03 Oct 2024 10:35)  Jardiance 10 mg oral tablet: 1 tab(s) orally once a day (03 Oct 2024 10:36)  losartan 25 mg oral tablet: 1 tab(s) orally once a day (03 Oct 2024 10:36)  meloxicam 15 mg oral tablet: 1 tab(s) orally once a day (03 Oct 2024 10:35)  montelukast: once a day (at bedtime) (03 Oct 2024 10:36)  omeprazole 40 mg oral delayed release capsule: 1 cap(s) orally (03 Oct 2024 10:36)  oxyCODONE 10 mg oral tablet: 1 tab(s) orally prn (03 Oct 2024 10:36)  Potassium Chloride (Eqv-Klor-Con 10) 10 mEq oral tablet, extended release: 1 tab(s) orally (03 Oct 2024 10:36)  psyllium:  (03 Oct 2024 10:36)  Stelara 45 mg/0.5 mL subcutaneous solution: 45 milligram(s) subcutaneously (03 Oct 2024 10:36)  tamsulosin 0.4 mg oral capsule: 1 cap(s) orally (03 Oct 2024 10:36)  Xanax 1 mg oral tablet: 1 tab(s) orally prn (03 Oct 2024 10:36)      REVIEW OF SYSTEMS:    Low back chronic pain , postop well controlled , all other systems are reviewed and are negative .     MEDICATIONS  (STANDING):  acetaminophen     Tablet .. 650 milliGRAM(s) Oral every 6 hours  ARIPiprazole 5 milliGRAM(s) Oral daily  ceFAZolin  Injectable. 2000 milliGRAM(s) IV Push every 8 hours  finasteride 5 milliGRAM(s) Oral daily  FLUoxetine 40 milliGRAM(s) Oral daily  furosemide    Tablet 40 milliGRAM(s) Oral two times a day  gabapentin 300 milliGRAM(s) Oral three times a day  HYDROmorphone PCA (1 mG/mL) 30 milliLiter(s) PCA Continuous PCA Continuous  influenza   Vaccine 0.5 milliLiter(s) IntraMuscular once  lactated ringers. 1000 milliLiter(s) (75 mL/Hr) IV Continuous <Continuous>  lactated ringers. 1000 milliLiter(s) (75 mL/Hr) IV Continuous <Continuous>  losartan 25 milliGRAM(s) Oral daily  montelukast 10 milliGRAM(s) Oral daily  pantoprazole    Tablet 40 milliGRAM(s) Oral before breakfast  potassium chloride    Tablet ER 10 milliEquivalent(s) Oral daily  senna 2 Tablet(s) Oral at bedtime  tamsulosin 0.4 milliGRAM(s) Oral at bedtime    MEDICATIONS  (PRN):  ALPRAZolam 1 milliGRAM(s) Oral daily PRN anxiety  fentaNYL    Injectable 25 MICROGram(s) IV Push every 5 minutes PRN Moderate Pain (4 - 6)  methocarbamol 750 milliGRAM(s) Oral every 8 hours PRN Muscle Spasm  naloxone Injectable 0.1 milliGRAM(s) IV Push every 3 minutes PRN For ANY of the following changes in patient status:  A. RR LESS THAN 10 breaths per minute, B. Oxygen saturation LESS THAN 90%, C. Sedation score of 6  ondansetron   Disintegrating Tablet 4 milliGRAM(s) Oral every 8 hours PRN Nausea and/or Vomiting  ondansetron Injectable 4 milliGRAM(s) IV Push every 6 hours PRN Nausea  zolpidem 5 milliGRAM(s) Oral at bedtime PRN Insomnia  zolpidem 5 milliGRAM(s) Oral at bedtime PRN Insomnia      Vital Signs Last 24 Hrs  T(C): 36.9 (04 Oct 2024 13:15), Max: 37.1 (03 Oct 2024 17:10)  T(F): 98.4 (04 Oct 2024 13:15), Max: 98.7 (03 Oct 2024 17:10)  HR: 80 (04 Oct 2024 13:15) (74 - 96)  BP: 104/- (04 Oct 2024 13:15) (104/- - 133/76)  BP(mean): 78 (03 Oct 2024 19:00) (72 - 90)  RR: 18 (04 Oct 2024 13:15) (12 - 18)  SpO2: 98% (04 Oct 2024 13:15) (93% - 100%)    Parameters below as of 04 Oct 2024 13:15  Patient On (Oxygen Delivery Method): nasal cannula  O2 Flow (L/min): 2      I&O's Detail    03 Oct 2024 07:01  -  04 Oct 2024 07:00  --------------------------------------------------------  IN:    Lactated Ringers: 825 mL    Oral Fluid: 3230 mL  Total IN: 4055 mL    OUT:    Accordian (mL): 60 mL    Bulb (mL): 390 mL    Intermittent Catheterization - Urethral (mL): 800 mL    Voided (mL): 0 mL  Total OUT: 1250 mL    Total NET: 2805 mL      04 Oct 2024 07:01  -  04 Oct 2024 14:25  --------------------------------------------------------  IN:    Oral Fluid: 360 mL  Total IN: 360 mL    OUT:    Accordian (mL): 0 mL    Bulb (mL): 90 mL    Voided (mL): 675 mL  Total OUT: 765 mL    Total NET: -405 mL        PHYSICAL EXAM:    GENERAL: NAD, obese   HEAD:  Atraumatic, Normocephalic  EYES: EOMI, PERRLA, conjunctiva and sclera clear  NECK: Supple, No JVD, Normal thyroid  NERVOUS SYSTEM:  Alert & Oriented X3,no focal deficit  CHEST/LUNG: CTA  b/l,  no rales, rhonchi, wheezing, or rubs  HEART: Regular rate and rhythm; No murmurs, rubs, or gallops  ABDOMEN: Soft, Nontender, Nondistended; Bowel sounds present  EXTREMITIES:  2+ Peripheral Pulses, No clubbing, cyanosis, or edema ,   LYMPH: No lymphadenopathy noted  SKIN: No rashes or lesions, b/l le chronic skin changes +   Low back with C/D/I dressing , drain +     LABS:                        11.6   8.27  )-----------( 136      ( 04 Oct 2024 05:21 )             32.8     10-04    130[L]  |  89[L]  |  11.0  ----------------------------<  98  2.8[LL]   |  28.0  |  0.57    Ca    7.9[L]      04 Oct 2024 12:23  Phos  2.0     10-04  Mg     1.7     10-04    TPro  5.5[L]  /  Alb  3.2[L]  /  TBili  0.6  /  DBili  x   /  AST  16  /  ALT  16  /  AlkPhos  49  10-04    PT/INR - ( 03 Oct 2024 10:45 )   PT: 12.2 sec;   INR: 1.05 ratio         PTT - ( 03 Oct 2024 10:45 )  PTT:39.4 sec    Thyroid Stimulating Hormone, Serum (10.04.24 @ 05:21)    Thyroid Stimulating Hormone, Serum: 1.77 uIU/mL    Osmolality, Serum (10.04.24 @ 05:21)    Osmolality, Serum: 261 mosmol/kg    Osmolality, Random Urine (10.04.24 @ 08:00)    Osmolality, Random Urine: 262: Test repeated. mosm/kg    Potassium, Random Urine (10.04.24 @ 08:00)    Potassium, Random Urine: 17: Reference Ranges have NOT been established for random urine analytes due  to variability in fluid intake and concentration. mmol/L    Sodium, Random Urine (10.04.24 @ 08:00)    Sodium, Random Urine: 31: Reference Ranges have NOT been established for random urine analytes due  to variability in fluid intake and concentration. mmol/L    RADIOLOGY & ADDITIONAL STUDIES:

## 2024-10-04 NOTE — CONSULT NOTE ADULT - ASSESSMENT
Pt is a 59 years old male. Pt reports  longstanding back pain since 2013, reports multiple back surgeries lumbar, cervical and thoracic region. Pt states  in 2021 thoracic spine surgery was complicated with  infection and hematoma and t was in the hospital for months for antibiotics management. Today, Pt reports  difficulty standing straight due to back pain, right leg  lymphedema, difficulty  walking uses a walker for mobility, progressively bilateral lower extremities  weakness over the past  few weeks. Pt medical hx includes ESPINOZA, heart HTN, lymphedema right leg, vascular insufficiency, asthma, BPH, obese BMI 46.7, heart valve disorder( Pt on Jardiance ), Crohn's disease.       Ambien 10 mg oral tablet: 1 tab(s) orally once a day (at bedtime) (03 Oct 2024 10:35)  ARIPiprazole 5 mg oral tablet: 1 tab(s) orally (03 Oct 2024 10:36)  cholecalciferol:  (03 Oct 2024 10:36)  finasteride 5 mg oral tablet: 1 tab(s) orally (03 Oct 2024 10:36)  FLUoxetine 40 mg oral capsule: 1 cap(s) orally (03 Oct 2024 10:35)  furosemide 40 mg oral tablet: 1 tab(s) orally 2 times a day (03 Oct 2024 10:36)  gabapentin 300 mg oral tablet: orally 3 times a day (03 Oct 2024 10:35)  Jardiance 10 mg oral tablet: 1 tab(s) orally once a day (03 Oct 2024 10:36)  losartan 25 mg oral tablet: 1 tab(s) orally once a day (03 Oct 2024 10:36)  meloxicam 15 mg oral tablet: 1 tab(s) orally once a day (03 Oct 2024 10:35)  montelukast: once a day (at bedtime) (03 Oct 2024 10:36)  omeprazole 40 mg oral delayed release capsule: 1 cap(s) orally (03 Oct 2024 10:36)  oxyCODONE 10 mg oral tablet: 1 tab(s) orally prn (03 Oct 2024 10:36)  Potassium Chloride (Eqv-Klor-Con 10) 10 mEq oral tablet, extended release: 1 tab(s) orally (03 Oct 2024 10:36)  psyllium:  (03 Oct 2024 10:36)  Stelara 45 mg/0.5 mL subcutaneous solution: 45 milligram(s) subcutaneously (03 Oct 2024 10:36)  tamsulosin 0.4 mg oral capsule: 1 cap(s) orally (03 Oct 2024 10:36)  Xanax 1 mg oral tablet: 1 tab(s) orally prn (03 Oct 2024 10:36)      1. L2/3 stenosis; Prior fusion L3-5  , s/p   Lumbar laminectomy with pedicle screw fusion  L2/3 laminectomy  L2/3 posterior spine fusion  L2-5 posterior instrumentation.  local autograft, POD#1   PT/OT/pain mgmt  DVT prophylaxis- as per ortho  Abx as per SCIP   Incentive spirometry  Prophylaxis of opioid  induced constipation.  Drain / wound care as per ortho     2. HTN - this am BP on lower side , restart Losartan on POD#2 if BP allows     3 . Hx of Lymphedema on Lasix 40 mg BID - CONTINUE     4. Hyponatremia , patient admits that he was drinking a lot of water since yesterday , had 5 pitchers of [employer] ,   urine / serum studies not c/w SIDH . Will d/c IVF , patient advised to decrese fluids intake , Na 127 improved to 130 ,   f/u labs in am     5. Hypokalemia - supplemented with 10 meq iv x2 , 40 mg PO , additional 20 mg PO ordered .   Continue home dose potassium , will supplement mag / phos . F/U electrolytes in am     6. Hx of BPH - on Flomax/ Finesteride , voiding ( voided 320 ml this am with PVR OF 375ML  . Nurse asked to check PVR in 4 hrs after patient ambulates and with standing position .   Continue bladder scans , straight cath for PVR > 350 ml     8. Hx of Asthma / ESPINOZA - stable - may use own CPAP     9 . Anemia - acute blood lost anemia - secondary to surgical blood lost - stable and asymptomatic. -  ml ,   patient is asymptomatic ,   f/u H/H and transfuse if Hg < 7 or patient symptomatic     10. DVT prophylaxis  - as per ortho protocol  Opioid induced constipation  prophylaxis - bowel regimen       Thank you for the courtesy of this consult ,   will follow along with you .

## 2024-10-04 NOTE — PROVIDER CONTACT NOTE (OTHER) - ASSESSMENT
bladder scan showed urinary retention of 437ccs, pt has the urge to urinate but is unable to despite trying

## 2024-10-04 NOTE — OCCUPATIONAL THERAPY INITIAL EVALUATION ADULT - LEVEL OF INDEPENDENCE: DRESS LOWER BODY, OT EVAL
max A for socks only, discussed use of LB devices, pt reports at baseline uses hip kit to don/doff socks/minimum assist (75% patients effort)/maximum assist (25% patients effort)

## 2024-10-04 NOTE — OCCUPATIONAL THERAPY INITIAL EVALUATION ADULT - LIVES WITH, PROFILE
Pt lives in a house with his wife and daughter who can assist. 1 DELFINA no handrail, no steps inside to negotiate/children/spouse

## 2024-10-05 LAB
ANION GAP SERPL CALC-SCNC: 9 MMOL/L — SIGNIFICANT CHANGE UP (ref 5–17)
BUN SERPL-MCNC: 8.1 MG/DL — SIGNIFICANT CHANGE UP (ref 8–20)
CALCIUM SERPL-MCNC: 8.1 MG/DL — LOW (ref 8.4–10.5)
CHLORIDE SERPL-SCNC: 92 MMOL/L — LOW (ref 96–108)
CO2 SERPL-SCNC: 31 MMOL/L — HIGH (ref 22–29)
CREAT SERPL-MCNC: 0.49 MG/DL — LOW (ref 0.5–1.3)
EGFR: 117 ML/MIN/1.73M2 — SIGNIFICANT CHANGE UP
GLUCOSE SERPL-MCNC: 113 MG/DL — HIGH (ref 70–99)
HCT VFR BLD CALC: 33.6 % — LOW (ref 39–50)
HGB BLD-MCNC: 11.8 G/DL — LOW (ref 13–17)
MAGNESIUM SERPL-MCNC: 2 MG/DL — SIGNIFICANT CHANGE UP (ref 1.6–2.6)
MCHC RBC-ENTMCNC: 30.2 PG — SIGNIFICANT CHANGE UP (ref 27–34)
MCHC RBC-ENTMCNC: 35.1 GM/DL — SIGNIFICANT CHANGE UP (ref 32–36)
MCV RBC AUTO: 85.9 FL — SIGNIFICANT CHANGE UP (ref 80–100)
PHOSPHATE SERPL-MCNC: 1.8 MG/DL — LOW (ref 2.4–4.7)
PLATELET # BLD AUTO: 141 K/UL — LOW (ref 150–400)
POTASSIUM SERPL-MCNC: 3 MMOL/L — LOW (ref 3.5–5.3)
POTASSIUM SERPL-SCNC: 3 MMOL/L — LOW (ref 3.5–5.3)
RBC # BLD: 3.91 M/UL — LOW (ref 4.2–5.8)
RBC # FLD: 12.8 % — SIGNIFICANT CHANGE UP (ref 10.3–14.5)
SODIUM SERPL-SCNC: 132 MMOL/L — LOW (ref 135–145)
WBC # BLD: 8.54 K/UL — SIGNIFICANT CHANGE UP (ref 3.8–10.5)
WBC # FLD AUTO: 8.54 K/UL — SIGNIFICANT CHANGE UP (ref 3.8–10.5)

## 2024-10-05 PROCEDURE — 99232 SBSQ HOSP IP/OBS MODERATE 35: CPT

## 2024-10-05 RX ORDER — POTASSIUM PHOSPHATE, MONOBASIC POTASSIUM PHOSPHATE, DIBASIC 224; 236 MG/ML; MG/ML
15 INJECTION, SOLUTION, CONCENTRATE INTRAVENOUS ONCE
Refills: 0 | Status: COMPLETED | OUTPATIENT
Start: 2024-10-05 | End: 2024-10-05

## 2024-10-05 RX ORDER — SOD PHOS DI, MONO/K PHOS MONO 250 MG
1 TABLET ORAL THREE TIMES A DAY
Refills: 0 | Status: DISCONTINUED | OUTPATIENT
Start: 2024-10-05 | End: 2024-10-07

## 2024-10-05 RX ADMIN — Medication 40 MILLIEQUIVALENT(S): at 13:29

## 2024-10-05 RX ADMIN — Medication 2000 MILLIGRAM(S): at 21:46

## 2024-10-05 RX ADMIN — FUROSEMIDE 40 MILLIGRAM(S): 10 INJECTION INTRAVENOUS at 06:09

## 2024-10-05 RX ADMIN — FUROSEMIDE 40 MILLIGRAM(S): 10 INJECTION INTRAVENOUS at 13:29

## 2024-10-05 RX ADMIN — Medication 650 MILLIGRAM(S): at 06:23

## 2024-10-05 RX ADMIN — Medication 650 MILLIGRAM(S): at 23:38

## 2024-10-05 RX ADMIN — Medication 650 MILLIGRAM(S): at 06:09

## 2024-10-05 RX ADMIN — HYDROMORPHONE HYDROCHLORIDE 30 MILLILITER(S): 1 INJECTION, SOLUTION INTRAMUSCULAR; INTRAVENOUS; SUBCUTANEOUS at 19:24

## 2024-10-05 RX ADMIN — Medication 2000 MILLIGRAM(S): at 13:29

## 2024-10-05 RX ADMIN — Medication 2 TABLET(S): at 21:46

## 2024-10-05 RX ADMIN — Medication 40 MILLIGRAM(S): at 11:27

## 2024-10-05 RX ADMIN — Medication 100 MILLIEQUIVALENT(S): at 15:54

## 2024-10-05 RX ADMIN — GABAPENTIN 300 MILLIGRAM(S): 800 TABLET, FILM COATED ORAL at 06:09

## 2024-10-05 RX ADMIN — Medication 650 MILLIGRAM(S): at 17:29

## 2024-10-05 RX ADMIN — GABAPENTIN 300 MILLIGRAM(S): 800 TABLET, FILM COATED ORAL at 21:46

## 2024-10-05 RX ADMIN — Medication 650 MILLIGRAM(S): at 18:29

## 2024-10-05 RX ADMIN — GABAPENTIN 300 MILLIGRAM(S): 800 TABLET, FILM COATED ORAL at 13:29

## 2024-10-05 RX ADMIN — Medication 650 MILLIGRAM(S): at 23:41

## 2024-10-05 RX ADMIN — MONTELUKAST SODIUM 10 MILLIGRAM(S): 10 TABLET, FILM COATED ORAL at 11:27

## 2024-10-05 RX ADMIN — Medication 650 MILLIGRAM(S): at 11:27

## 2024-10-05 RX ADMIN — LOSARTAN POTASSIUM 25 MILLIGRAM(S): 100 TABLET, FILM COATED ORAL at 06:09

## 2024-10-05 RX ADMIN — FINASTERIDE 5 MILLIGRAM(S): 5 TABLET, FILM COATED ORAL at 11:27

## 2024-10-05 RX ADMIN — Medication 100 MILLIEQUIVALENT(S): at 13:30

## 2024-10-05 RX ADMIN — Medication 0.4 MILLIGRAM(S): at 21:46

## 2024-10-05 RX ADMIN — ARIPIPRAZOLE 5 MILLIGRAM(S): 5 TABLET ORAL at 11:27

## 2024-10-05 RX ADMIN — Medication 650 MILLIGRAM(S): at 00:00

## 2024-10-05 RX ADMIN — HYDROMORPHONE HYDROCHLORIDE 30 MILLILITER(S): 1 INJECTION, SOLUTION INTRAMUSCULAR; INTRAVENOUS; SUBCUTANEOUS at 07:38

## 2024-10-05 RX ADMIN — Medication 2000 MILLIGRAM(S): at 06:08

## 2024-10-05 RX ADMIN — PANTOPRAZOLE SODIUM 40 MILLIGRAM(S): 40 TABLET, DELAYED RELEASE ORAL at 06:09

## 2024-10-05 RX ADMIN — Medication 1 TABLET(S): at 21:57

## 2024-10-05 RX ADMIN — Medication 650 MILLIGRAM(S): at 12:27

## 2024-10-05 RX ADMIN — Medication 1 TABLET(S): at 13:30

## 2024-10-05 RX ADMIN — Medication 10 MILLIEQUIVALENT(S): at 11:27

## 2024-10-05 RX ADMIN — Medication 1 PACKET(S): at 06:09

## 2024-10-05 RX ADMIN — POTASSIUM PHOSPHATE, MONOBASIC POTASSIUM PHOSPHATE, DIBASIC 62.5 MILLIMOLE(S): 224; 236 INJECTION, SOLUTION, CONCENTRATE INTRAVENOUS at 18:20

## 2024-10-06 LAB
ALBUMIN SERPL ELPH-MCNC: 3.1 G/DL — LOW (ref 3.3–5.2)
ALP SERPL-CCNC: 46 U/L — SIGNIFICANT CHANGE UP (ref 40–120)
ALT FLD-CCNC: 12 U/L — SIGNIFICANT CHANGE UP
ANION GAP SERPL CALC-SCNC: 10 MMOL/L — SIGNIFICANT CHANGE UP (ref 5–17)
AST SERPL-CCNC: 15 U/L — SIGNIFICANT CHANGE UP
BILIRUB SERPL-MCNC: 0.4 MG/DL — SIGNIFICANT CHANGE UP (ref 0.4–2)
BUN SERPL-MCNC: 9.7 MG/DL — SIGNIFICANT CHANGE UP (ref 8–20)
CALCIUM SERPL-MCNC: 8.1 MG/DL — LOW (ref 8.4–10.5)
CHLORIDE SERPL-SCNC: 96 MMOL/L — SIGNIFICANT CHANGE UP (ref 96–108)
CO2 SERPL-SCNC: 29 MMOL/L — SIGNIFICANT CHANGE UP (ref 22–29)
CREAT SERPL-MCNC: 0.64 MG/DL — SIGNIFICANT CHANGE UP (ref 0.5–1.3)
EGFR: 108 ML/MIN/1.73M2 — SIGNIFICANT CHANGE UP
GLUCOSE SERPL-MCNC: 107 MG/DL — HIGH (ref 70–99)
HCT VFR BLD CALC: 33.8 % — LOW (ref 39–50)
HGB BLD-MCNC: 11.9 G/DL — LOW (ref 13–17)
MCHC RBC-ENTMCNC: 30.7 PG — SIGNIFICANT CHANGE UP (ref 27–34)
MCHC RBC-ENTMCNC: 35.2 GM/DL — SIGNIFICANT CHANGE UP (ref 32–36)
MCV RBC AUTO: 87.3 FL — SIGNIFICANT CHANGE UP (ref 80–100)
PHOSPHATE SERPL-MCNC: 2.6 MG/DL — SIGNIFICANT CHANGE UP (ref 2.4–4.7)
PLATELET # BLD AUTO: 148 K/UL — LOW (ref 150–400)
POTASSIUM SERPL-MCNC: 3.4 MMOL/L — LOW (ref 3.5–5.3)
POTASSIUM SERPL-SCNC: 3.4 MMOL/L — LOW (ref 3.5–5.3)
PROT SERPL-MCNC: 5.9 G/DL — LOW (ref 6.6–8.7)
RBC # BLD: 3.87 M/UL — LOW (ref 4.2–5.8)
RBC # FLD: 13 % — SIGNIFICANT CHANGE UP (ref 10.3–14.5)
SODIUM SERPL-SCNC: 135 MMOL/L — SIGNIFICANT CHANGE UP (ref 135–145)
WBC # BLD: 8.68 K/UL — SIGNIFICANT CHANGE UP (ref 3.8–10.5)
WBC # FLD AUTO: 8.68 K/UL — SIGNIFICANT CHANGE UP (ref 3.8–10.5)

## 2024-10-06 PROCEDURE — 99232 SBSQ HOSP IP/OBS MODERATE 35: CPT

## 2024-10-06 RX ORDER — OXYCODONE HYDROCHLORIDE 30 MG/1
10 TABLET, FILM COATED, EXTENDED RELEASE ORAL
Refills: 0 | Status: DISCONTINUED | OUTPATIENT
Start: 2024-10-06 | End: 2024-10-07

## 2024-10-06 RX ORDER — HYDROMORPHONE HYDROCHLORIDE 1 MG/ML
4 INJECTION, SOLUTION INTRAMUSCULAR; INTRAVENOUS; SUBCUTANEOUS
Refills: 0 | Status: DISCONTINUED | OUTPATIENT
Start: 2024-10-06 | End: 2024-10-07

## 2024-10-06 RX ORDER — OXYCODONE HYDROCHLORIDE 30 MG/1
5 TABLET, FILM COATED, EXTENDED RELEASE ORAL
Refills: 0 | Status: DISCONTINUED | OUTPATIENT
Start: 2024-10-06 | End: 2024-10-07

## 2024-10-06 RX ADMIN — Medication 650 MILLIGRAM(S): at 12:23

## 2024-10-06 RX ADMIN — Medication 0.4 MILLIGRAM(S): at 21:16

## 2024-10-06 RX ADMIN — FUROSEMIDE 40 MILLIGRAM(S): 10 INJECTION INTRAVENOUS at 05:28

## 2024-10-06 RX ADMIN — Medication 2 TABLET(S): at 21:16

## 2024-10-06 RX ADMIN — GABAPENTIN 300 MILLIGRAM(S): 800 TABLET, FILM COATED ORAL at 21:16

## 2024-10-06 RX ADMIN — OXYCODONE HYDROCHLORIDE 10 MILLIGRAM(S): 30 TABLET, FILM COATED, EXTENDED RELEASE ORAL at 18:18

## 2024-10-06 RX ADMIN — Medication 1 TABLET(S): at 13:15

## 2024-10-06 RX ADMIN — OXYCODONE HYDROCHLORIDE 10 MILLIGRAM(S): 30 TABLET, FILM COATED, EXTENDED RELEASE ORAL at 22:00

## 2024-10-06 RX ADMIN — Medication 40 MILLIGRAM(S): at 11:23

## 2024-10-06 RX ADMIN — ARIPIPRAZOLE 5 MILLIGRAM(S): 5 TABLET ORAL at 11:23

## 2024-10-06 RX ADMIN — OXYCODONE HYDROCHLORIDE 10 MILLIGRAM(S): 30 TABLET, FILM COATED, EXTENDED RELEASE ORAL at 17:18

## 2024-10-06 RX ADMIN — Medication 650 MILLIGRAM(S): at 05:34

## 2024-10-06 RX ADMIN — PANTOPRAZOLE SODIUM 40 MILLIGRAM(S): 40 TABLET, DELAYED RELEASE ORAL at 05:29

## 2024-10-06 RX ADMIN — Medication 1 TABLET(S): at 05:28

## 2024-10-06 RX ADMIN — Medication 40 MILLIEQUIVALENT(S): at 11:22

## 2024-10-06 RX ADMIN — Medication 650 MILLIGRAM(S): at 18:18

## 2024-10-06 RX ADMIN — FINASTERIDE 5 MILLIGRAM(S): 5 TABLET, FILM COATED ORAL at 11:23

## 2024-10-06 RX ADMIN — Medication 650 MILLIGRAM(S): at 17:18

## 2024-10-06 RX ADMIN — Medication 650 MILLIGRAM(S): at 05:29

## 2024-10-06 RX ADMIN — MONTELUKAST SODIUM 10 MILLIGRAM(S): 10 TABLET, FILM COATED ORAL at 11:23

## 2024-10-06 RX ADMIN — Medication 650 MILLIGRAM(S): at 11:23

## 2024-10-06 RX ADMIN — Medication 10 MILLIEQUIVALENT(S): at 11:23

## 2024-10-06 RX ADMIN — GABAPENTIN 300 MILLIGRAM(S): 800 TABLET, FILM COATED ORAL at 05:29

## 2024-10-06 RX ADMIN — HYDROMORPHONE HYDROCHLORIDE 30 MILLILITER(S): 1 INJECTION, SOLUTION INTRAMUSCULAR; INTRAVENOUS; SUBCUTANEOUS at 07:09

## 2024-10-06 RX ADMIN — GABAPENTIN 300 MILLIGRAM(S): 800 TABLET, FILM COATED ORAL at 13:15

## 2024-10-06 RX ADMIN — LOSARTAN POTASSIUM 25 MILLIGRAM(S): 100 TABLET, FILM COATED ORAL at 05:29

## 2024-10-06 RX ADMIN — Medication 2000 MILLIGRAM(S): at 05:28

## 2024-10-06 RX ADMIN — OXYCODONE HYDROCHLORIDE 10 MILLIGRAM(S): 30 TABLET, FILM COATED, EXTENDED RELEASE ORAL at 21:21

## 2024-10-06 RX ADMIN — FUROSEMIDE 40 MILLIGRAM(S): 10 INJECTION INTRAVENOUS at 13:16

## 2024-10-06 RX ADMIN — Medication 1 TABLET(S): at 21:16

## 2024-10-07 ENCOUNTER — TRANSCRIPTION ENCOUNTER (OUTPATIENT)
Age: 59
End: 2024-10-07

## 2024-10-07 VITALS
TEMPERATURE: 98 F | SYSTOLIC BLOOD PRESSURE: 125 MMHG | HEART RATE: 78 BPM | DIASTOLIC BLOOD PRESSURE: 83 MMHG | OXYGEN SATURATION: 96 % | RESPIRATION RATE: 18 BRPM

## 2024-10-07 LAB
ANION GAP SERPL CALC-SCNC: 9 MMOL/L — SIGNIFICANT CHANGE UP (ref 5–17)
BUN SERPL-MCNC: 7.9 MG/DL — LOW (ref 8–20)
CALCIUM SERPL-MCNC: 8.5 MG/DL — SIGNIFICANT CHANGE UP (ref 8.4–10.5)
CHLORIDE SERPL-SCNC: 96 MMOL/L — SIGNIFICANT CHANGE UP (ref 96–108)
CO2 SERPL-SCNC: 30 MMOL/L — HIGH (ref 22–29)
CREAT SERPL-MCNC: 0.66 MG/DL — SIGNIFICANT CHANGE UP (ref 0.5–1.3)
EGFR: 107 ML/MIN/1.73M2 — SIGNIFICANT CHANGE UP
GLUCOSE SERPL-MCNC: 112 MG/DL — HIGH (ref 70–99)
HCT VFR BLD CALC: 35.5 % — LOW (ref 39–50)
HGB BLD-MCNC: 12.1 G/DL — LOW (ref 13–17)
MCHC RBC-ENTMCNC: 30.1 PG — SIGNIFICANT CHANGE UP (ref 27–34)
MCHC RBC-ENTMCNC: 34.1 GM/DL — SIGNIFICANT CHANGE UP (ref 32–36)
MCV RBC AUTO: 88.3 FL — SIGNIFICANT CHANGE UP (ref 80–100)
PLATELET # BLD AUTO: 174 K/UL — SIGNIFICANT CHANGE UP (ref 150–400)
POTASSIUM SERPL-MCNC: 3.7 MMOL/L — SIGNIFICANT CHANGE UP (ref 3.5–5.3)
POTASSIUM SERPL-SCNC: 3.7 MMOL/L — SIGNIFICANT CHANGE UP (ref 3.5–5.3)
RBC # BLD: 4.02 M/UL — LOW (ref 4.2–5.8)
RBC # FLD: 12.9 % — SIGNIFICANT CHANGE UP (ref 10.3–14.5)
SODIUM SERPL-SCNC: 135 MMOL/L — SIGNIFICANT CHANGE UP (ref 135–145)
WBC # BLD: 8.55 K/UL — SIGNIFICANT CHANGE UP (ref 3.8–10.5)
WBC # FLD AUTO: 8.55 K/UL — SIGNIFICANT CHANGE UP (ref 3.8–10.5)

## 2024-10-07 PROCEDURE — C1889: CPT

## 2024-10-07 PROCEDURE — 82570 ASSAY OF URINE CREATININE: CPT

## 2024-10-07 PROCEDURE — C9399: CPT

## 2024-10-07 PROCEDURE — 81003 URINALYSIS AUTO W/O SCOPE: CPT

## 2024-10-07 PROCEDURE — 80053 COMPREHEN METABOLIC PANEL: CPT

## 2024-10-07 PROCEDURE — 80048 BASIC METABOLIC PNL TOTAL CA: CPT

## 2024-10-07 PROCEDURE — 86901 BLOOD TYPING SEROLOGIC RH(D): CPT

## 2024-10-07 PROCEDURE — 83935 ASSAY OF URINE OSMOLALITY: CPT

## 2024-10-07 PROCEDURE — 99232 SBSQ HOSP IP/OBS MODERATE 35: CPT

## 2024-10-07 PROCEDURE — 86900 BLOOD TYPING SEROLOGIC ABO: CPT

## 2024-10-07 PROCEDURE — 85027 COMPLETE CBC AUTOMATED: CPT

## 2024-10-07 PROCEDURE — 97116 GAIT TRAINING THERAPY: CPT

## 2024-10-07 PROCEDURE — 84133 ASSAY OF URINE POTASSIUM: CPT

## 2024-10-07 PROCEDURE — 84540 ASSAY OF URINE/UREA-N: CPT

## 2024-10-07 PROCEDURE — 84100 ASSAY OF PHOSPHORUS: CPT

## 2024-10-07 PROCEDURE — 36415 COLL VENOUS BLD VENIPUNCTURE: CPT

## 2024-10-07 PROCEDURE — 86850 RBC ANTIBODY SCREEN: CPT

## 2024-10-07 PROCEDURE — 85730 THROMBOPLASTIN TIME PARTIAL: CPT

## 2024-10-07 PROCEDURE — 97535 SELF CARE MNGMENT TRAINING: CPT

## 2024-10-07 PROCEDURE — 84443 ASSAY THYROID STIM HORMONE: CPT

## 2024-10-07 PROCEDURE — 76000 FLUOROSCOPY <1 HR PHYS/QHP: CPT

## 2024-10-07 PROCEDURE — 85610 PROTHROMBIN TIME: CPT

## 2024-10-07 PROCEDURE — 83930 ASSAY OF BLOOD OSMOLALITY: CPT

## 2024-10-07 PROCEDURE — 84156 ASSAY OF PROTEIN URINE: CPT

## 2024-10-07 PROCEDURE — 97530 THERAPEUTIC ACTIVITIES: CPT

## 2024-10-07 PROCEDURE — 83735 ASSAY OF MAGNESIUM: CPT

## 2024-10-07 PROCEDURE — 84300 ASSAY OF URINE SODIUM: CPT

## 2024-10-07 PROCEDURE — C1713: CPT

## 2024-10-07 PROCEDURE — 97110 THERAPEUTIC EXERCISES: CPT

## 2024-10-07 RX ORDER — OXYCODONE HYDROCHLORIDE 30 MG/1
1 TABLET, FILM COATED, EXTENDED RELEASE ORAL
Qty: 28 | Refills: 0
Start: 2024-10-07 | End: 2024-10-13

## 2024-10-07 RX ORDER — NALOXONE HYDROCHLORIDE 0.4 MG/ML
1 INJECTION, SOLUTION INTRAMUSCULAR; INTRAVENOUS; SUBCUTANEOUS
Qty: 1 | Refills: 0
Start: 2024-10-07

## 2024-10-07 RX ORDER — ACETAMINOPHEN 325 MG
2 TABLET ORAL
Qty: 0 | Refills: 0 | DISCHARGE
Start: 2024-10-07

## 2024-10-07 RX ADMIN — FUROSEMIDE 40 MILLIGRAM(S): 10 INJECTION INTRAVENOUS at 13:29

## 2024-10-07 RX ADMIN — FUROSEMIDE 40 MILLIGRAM(S): 10 INJECTION INTRAVENOUS at 05:36

## 2024-10-07 RX ADMIN — OXYCODONE HYDROCHLORIDE 10 MILLIGRAM(S): 30 TABLET, FILM COATED, EXTENDED RELEASE ORAL at 02:55

## 2024-10-07 RX ADMIN — OXYCODONE HYDROCHLORIDE 10 MILLIGRAM(S): 30 TABLET, FILM COATED, EXTENDED RELEASE ORAL at 14:20

## 2024-10-07 RX ADMIN — GABAPENTIN 300 MILLIGRAM(S): 800 TABLET, FILM COATED ORAL at 13:28

## 2024-10-07 RX ADMIN — OXYCODONE HYDROCHLORIDE 10 MILLIGRAM(S): 30 TABLET, FILM COATED, EXTENDED RELEASE ORAL at 13:29

## 2024-10-07 RX ADMIN — Medication 650 MILLIGRAM(S): at 00:03

## 2024-10-07 RX ADMIN — Medication 650 MILLIGRAM(S): at 11:42

## 2024-10-07 RX ADMIN — LOSARTAN POTASSIUM 25 MILLIGRAM(S): 100 TABLET, FILM COATED ORAL at 05:36

## 2024-10-07 RX ADMIN — ARIPIPRAZOLE 5 MILLIGRAM(S): 5 TABLET ORAL at 11:42

## 2024-10-07 RX ADMIN — Medication 650 MILLIGRAM(S): at 17:10

## 2024-10-07 RX ADMIN — OXYCODONE HYDROCHLORIDE 10 MILLIGRAM(S): 30 TABLET, FILM COATED, EXTENDED RELEASE ORAL at 08:23

## 2024-10-07 RX ADMIN — Medication 650 MILLIGRAM(S): at 00:30

## 2024-10-07 RX ADMIN — Medication 10 MILLIEQUIVALENT(S): at 11:42

## 2024-10-07 RX ADMIN — OXYCODONE HYDROCHLORIDE 10 MILLIGRAM(S): 30 TABLET, FILM COATED, EXTENDED RELEASE ORAL at 09:32

## 2024-10-07 RX ADMIN — Medication 40 MILLIGRAM(S): at 11:42

## 2024-10-07 RX ADMIN — Medication 1 TABLET(S): at 13:29

## 2024-10-07 RX ADMIN — FINASTERIDE 5 MILLIGRAM(S): 5 TABLET, FILM COATED ORAL at 11:42

## 2024-10-07 RX ADMIN — Medication 650 MILLIGRAM(S): at 12:43

## 2024-10-07 RX ADMIN — OXYCODONE HYDROCHLORIDE 10 MILLIGRAM(S): 30 TABLET, FILM COATED, EXTENDED RELEASE ORAL at 03:30

## 2024-10-07 RX ADMIN — Medication 1 TABLET(S): at 05:36

## 2024-10-07 RX ADMIN — Medication 650 MILLIGRAM(S): at 05:36

## 2024-10-07 RX ADMIN — MONTELUKAST SODIUM 10 MILLIGRAM(S): 10 TABLET, FILM COATED ORAL at 11:42

## 2024-10-07 RX ADMIN — OXYCODONE HYDROCHLORIDE 10 MILLIGRAM(S): 30 TABLET, FILM COATED, EXTENDED RELEASE ORAL at 17:10

## 2024-10-07 RX ADMIN — PANTOPRAZOLE SODIUM 40 MILLIGRAM(S): 40 TABLET, DELAYED RELEASE ORAL at 05:36

## 2024-10-07 RX ADMIN — Medication 650 MILLIGRAM(S): at 05:38

## 2024-10-07 NOTE — DISCHARGE NOTE PROVIDER - NSDCMRMEDTOKEN_GEN_ALL_CORE_FT
acetaminophen 325 mg oral tablet: 2 tab(s) orally every 6 hours  Ambien 10 mg oral tablet: 1 tab(s) orally once a day (at bedtime)  ARIPiprazole 5 mg oral tablet: 1 tab(s) orally  cholecalciferol:   finasteride 5 mg oral tablet: 1 tab(s) orally  FLUoxetine 40 mg oral capsule: 1 cap(s) orally  furosemide 40 mg oral tablet: 1 tab(s) orally 2 times a day  gabapentin 300 mg oral tablet: orally 3 times a day  Jardiance 10 mg oral tablet: 1 tab(s) orally once a day  losartan 25 mg oral tablet: 1 tab(s) orally once a day  methocarbamol 750 mg oral tablet: 1 tab(s) orally 3 times a day as needed for spasm  montelukast: once a day (at bedtime)  Narcan 4 mg/0.1 mL nasal spray: 1 spray(s) intranasally once  omeprazole 40 mg oral delayed release capsule: 1 cap(s) orally  oxyCODONE 5 mg oral tablet: 1 tab(s) orally every 6 hours as needed for  moderate pain MDD: 4  Potassium Chloride (Eqv-Klor-Con 10) 10 mEq oral tablet, extended release: 1 tab(s) orally  Senna S 50 mg-8.6 mg oral tablet: 2 tab(s) orally once a day (at bedtime)  Stelara 45 mg/0.5 mL subcutaneous solution: 45 milligram(s) subcutaneously  tamsulosin 0.4 mg oral capsule: 1 cap(s) orally  Xanax 1 mg oral tablet: 1 tab(s) orally prn

## 2024-10-07 NOTE — DISCHARGE NOTE PROVIDER - NSDCFUSCHEDAPPT_GEN_ALL_CORE_FT
[facility]  [address] [facility]   Scheduled Appointment: 10/16/2024

## 2024-10-07 NOTE — DISCHARGE NOTE NURSING/CASE MANAGEMENT/SOCIAL WORK - NSDCPEFALRISK_GEN_ALL_CORE
For information on Fall & Injury Prevention, visit: [email or web address] OR  [email or web address] OR  https://www.cdc.gov/steadi/patient.html

## 2024-10-07 NOTE — PROGRESS NOTE ADULT - PROVIDER SPECIALTY LIST ADULT
Orthopedics
Orthopedics
Hospitalist
Orthopedics

## 2024-10-07 NOTE — DISCHARGE NOTE NURSING/CASE MANAGEMENT/SOCIAL WORK - PATIENT PORTAL LINK FT
You can access the FollowMyHealth Patient Portal offered by [facility] by registering at the following website: [email or web address]. By joining [facility]’s FollowMyHealth portal, you will also be able to view your health information using other applications (apps) compatible with our system.

## 2024-10-07 NOTE — DISCHARGE NOTE PROVIDER - NSDCFUADDINST_GEN_ALL_CORE_FT
Follow-up with Dr. Murdock within 7-10 days. Dressing change daily until dry, then leave dressing in place until office follow-up. Pain medications as indicated and titrated to patient's needs.   Ambulate with assistance of walker. Contact office if the wound becomes red, opens or discharge increases.    Please make an appointment for follow up with your surgeon in 1-2 weeks. Call to schedule an appointment. Staples or stitches will be removed at your follow up appointment. Keep incision site clean and dry. No creams, lotions, or ointments to incision area. You are cleared to shower 3 days after surgery unless otherwise instructed.    Take pain medication as prescribed after surgery for pain control. Contact your surgeon's office if pain increases while taking prescribed pain medications or if related concerns develop. If you are taking narcotic pain medications, take a stool softener with it to prevent narcotic associated constipation. Additionally, increase water intake (drink at least 8 glasses daily) and add fiber to your diet by eating fruits, vegetables and foods that are rich in grains.     Do NOT take NSAIDs, including ibuprofen, Advil, Aleve, and Motrin for at least 3 months after your surgery as these medications can impact your healing.    NO heavy lifting, strenuous activity, twisting, bending, driving, or working until cleared by your surgeon  Contact your surgeon's office immediately for any of the following: fever, bleeding, new onset numbness/tingling/weakness, nausea and/or vomiting, urinary and/or fecal incontinence or retention. If an emergency occurs (chest pain, shortness of breath, new confusion, seizure, altered mental status, or other), call 911 and go to the emergency department for evaluation.

## 2024-10-07 NOTE — PROGRESS NOTE ADULT - ASSESSMENT
Pt is a 59 years old male. Pt reports  longstanding back pain since 2013, reports multiple back surgeries lumbar, cervical and thoracic region. Pt states  in 2021 thoracic spine surgery was complicated with  infection and hematoma and t was in the hospital for months for antibiotics management. Today, Pt reports  difficulty standing straight due to back pain, right leg  lymphedema, difficulty  walking uses a walker for mobility, progressively bilateral lower extremities  weakness over the past  few weeks. Pt medical hx includes ESPINOZA, heart HTN, lymphedema right leg, vascular insufficiency, asthma, BPH, obese BMI 46.7, heart valve disorder( Pt on Jardiance ), Crohn's disease.     Plan:       1. L2/3 stenosis; Prior fusion L3-5  s/p Lumbar laminectomy with pedicle screw fusion  L2/3 laminectomy  L2/3 posterior spine fusion  L2-5 posterior instrumentation.  local autograft, POD#2  PT/OT/pain mgmt  DVT prophylaxis- as per ortho  Abx as per SCIP   Incentive spirometry  Prophylaxis of opioid  induced constipation.  Drain / wound care as per ortho     2. HTN - this am BP on lower side , restart Losartan on POD#2 if BP allows     3 . Hx of Lymphedema on Lasix 40 mg BID - CONTINUE     4. Hyponatremia: improving with water restriction, Na 127 improved to 135 ,   f/u labs in am     5. Hypokalemia - supplemented with 10 meq iv x2 , 40 mg PO , additional 20 mg  repeat K is 3.4, will supplement more, Continue home dose potassium , will supplement mag / phos . F/U electrolytes in am     6. Hx of BPH - on Flomax/ Finesteride , voiding ( voided 320 ml this am with PVR OF 375ML  . Nurse asked to check PVR in 4 hrs after patient ambulates and with standing position .   Continue bladder scans , straight cath for PVR > 350 ml     8. Hx of Asthma / ESPINOZA - stable - may use own CPAP     9 . Anemia - acute blood lost anemia - secondary to surgical blood lost - stable and asymptomatic. -  ml, patient is asymptomatic ,   f/u H/H and transfuse if Hg < 7 or patient symptomatic.     10. DVT prophylaxis  - as per ortho protocol  Opioid induced constipation  prophylaxis - bowel regimen     11. Hypophosphatemia: being replaced.   
Pt is a 59 years old male. Pt reports  longstanding back pain since 2013, reports multiple back surgeries lumbar, cervical and thoracic region. Pt states  in 2021 thoracic spine surgery was complicated with  infection and hematoma and t was in the hospital for months for antibiotics management. Today, Pt reports  difficulty standing straight due to back pain, right leg  lymphedema, difficulty  walking uses a walker for mobility, progressively bilateral lower extremities  weakness over the past  few weeks. Pt medical hx includes ESPINOZA, heart HTN, lymphedema right leg, vascular insufficiency, asthma, BPH, obese BMI 46.7, heart valve disorder( Pt on Jardiance ), Crohn's disease.     Plan:       1. L2/3 stenosis; Prior fusion L3-5  , s/p   Lumbar laminectomy with pedicle screw fusion  L2/3 laminectomy  L2/3 posterior spine fusion  L2-5 posterior instrumentation.  local autograft, POD#2  PT/OT/pain mgmt  DVT prophylaxis- as per ortho  Abx as per SCIP   Incentive spirometry  Prophylaxis of opioid  induced constipation.  Drain / wound care as per ortho     2. HTN - this am BP on lower side , restart Losartan on POD#2 if BP allows     3 . Hx of Lymphedema on Lasix 40 mg BID - CONTINUE     4. Hyponatremia: improving with water restriction, Na 127 improved to 132 ,   f/u labs in am     5. Hypokalemia - supplemented with 10 meq iv x2 , 40 mg PO , additional 20 mg  repeat K is 3, will supplement more  Continue home dose potassium , will supplement mag / phos . F/U electrolytes in am     6. Hx of BPH - on Flomax/ Finesteride , voiding ( voided 320 ml this am with PVR OF 375ML  . Nurse asked to check PVR in 4 hrs after patient ambulates and with standing position .   Continue bladder scans , straight cath for PVR > 350 ml     8. Hx of Asthma / ESPINOZA - stable - may use own CPAP     9 . Anemia - acute blood lost anemia - secondary to surgical blood lost - stable and asymptomatic. -  ml ,   patient is asymptomatic ,   f/u H/H and transfuse if Hg < 7 or patient symptomatic     10. DVT prophylaxis  - as per ortho protocol  Opioid induced constipation  prophylaxis - bowel regimen     11. Hypophosphatemia: being replaced.   
Pt is a 59 years old male. Pt reports  longstanding back pain since 2013, reports multiple back surgeries lumbar, cervical and thoracic region. Pt states  in 2021 thoracic spine surgery was complicated with  infection and hematoma and t was in the hospital for months for antibiotics management. Today, Pt reports  difficulty standing straight due to back pain, right leg  lymphedema, difficulty  walking uses a walker for mobility, progressively bilateral lower extremities  weakness over the past  few weeks. Pt medical hx includes ESPINOZA, heart HTN, lymphedema right leg, vascular insufficiency, asthma, BPH, obese BMI 46.7, heart valve disorder( Pt on Jardiance ), Crohn's disease.     Plan:       1. L2/3 stenosis; Prior fusion L3-5  s/p Lumbar laminectomy with pedicle screw fusion  L2/3 laminectomy  L2/3 posterior spine fusion  L2-5 posterior instrumentation.  local autograft   PT/OT/pain mgmt  DVT prophylaxis- as per ortho  Abx as per SCIP   Incentive spirometry  Prophylaxis of opioid  induced constipation.  Drain / wound care as per ortho     2. HTN - this am BP on lower side , restart Losartan on POD#2 if BP allows     3 . Hx of Lymphedema on Lasix 40 mg BID - CONTINUE     4. Hyponatremia: improving with water restriction, Na 127 improved to 137     5. Hypokalemia - supplemented with 10 meq iv x2 , 40 mg PO , additional 20 mg  repeat K is 3.7     6. Hx of BPH - on Flomax/ Finesteride , voiding ( voided 320 ml this am with PVR OF 375ML  . Nurse asked to check PVR in 4 hrs after patient ambulates and with standing position .   Continue bladder scans , straight cath for PVR > 350 ml     8. Hx of Asthma / ESPINOZA - stable - may use own CPAP     9 . Anemia - acute blood lost anemia - secondary to surgical blood lost - stable and asymptomatic. -  ml, patient is asymptomatic ,   f/u H/H and transfuse if Hg < 7 or patient symptomatic.     10. DVT prophylaxis  - as per ortho protocol  Opioid induced constipation  prophylaxis - bowel regimen     11. Hypophosphatemia: replaced.     Patient is stable from the medicine point of view for discharge pending PT and Ortho eval.     Medicine team is signing off, please call as needed

## 2024-10-07 NOTE — DISCHARGE NOTE PROVIDER - NSDCCPCAREPLAN_GEN_ALL_CORE_FT
PRINCIPAL DISCHARGE DIAGNOSIS  Diagnosis: Disc displacement, lumbar  Assessment and Plan of Treatment:

## 2024-10-07 NOTE — DISCHARGE NOTE PROVIDER - CARE PROVIDER_API CALL
Jonas Berry.  Orthopaedic Surgery  [address], [address]  [place] [address]  Phone: [phone]  Fax: [phone]  Follow Up Time:

## 2024-10-07 NOTE — PROGRESS NOTE ADULT - SUBJECTIVE AND OBJECTIVE BOX
Pt Name: TYRONE ARCOS    MRN: [id number]      Patient is a POD#4 s/p L2-3 lumbar laminectomy  comfortable at this time.   No new orthopedic complaints.     PAST MEDICAL & SURGICAL HISTORY:  PAST MEDICAL & SURGICAL HISTORY:  Lumbar herniated disc      Hypertension      Lymphedema of leg      ESPINOZA on CPAP      Heart valve disorder      Disc displacement, lumbar      Lumbar stenosis      History of laminectomy      History of spinal fusion          Allergies: No Known Allergies      Medications: acetaminophen     Tablet .. 650 milliGRAM(s) Oral every 6 hours  ALPRAZolam 1 milliGRAM(s) Oral daily PRN  ARIPiprazole 5 milliGRAM(s) Oral daily  fentaNYL    Injectable 25 MICROGram(s) IV Push every 5 minutes PRN  finasteride 5 milliGRAM(s) Oral daily  FLUoxetine 40 milliGRAM(s) Oral daily  furosemide    Tablet 40 milliGRAM(s) Oral two times a day  gabapentin 300 milliGRAM(s) Oral three times a day  HYDROmorphone   Tablet 4 milliGRAM(s) Oral every 3 hours PRN  influenza   Vaccine 0.5 milliLiter(s) IntraMuscular once  lactated ringers. 500 milliLiter(s) IV Continuous <Continuous>  losartan 25 milliGRAM(s) Oral daily  methocarbamol 750 milliGRAM(s) Oral every 8 hours PRN  montelukast 10 milliGRAM(s) Oral daily  naloxone Injectable 0.1 milliGRAM(s) IV Push every 3 minutes PRN  ondansetron   Disintegrating Tablet 4 milliGRAM(s) Oral every 8 hours PRN  ondansetron Injectable 4 milliGRAM(s) IV Push every 6 hours PRN  oxyCODONE    IR 10 milliGRAM(s) Oral every 3 hours PRN  oxyCODONE    IR 5 milliGRAM(s) Oral every 3 hours PRN  pantoprazole    Tablet 40 milliGRAM(s) Oral before breakfast  potassium chloride    Tablet ER 10 milliEquivalent(s) Oral daily  potassium phosphate / sodium phosphate Tablet (K-PHOS No. 2) 1 Tablet(s) Oral three times a day  senna 2 Tablet(s) Oral at bedtime  tamsulosin 0.4 milliGRAM(s) Oral at bedtime  zolpidem 5 milliGRAM(s) Oral at bedtime PRN  zolpidem 5 milliGRAM(s) Oral at bedtime PRN        Ambulation:  With Walker                        12.1   8.55  )-----------( 174      ( 07 Oct 2024 04:50 )             35.5     10-07    135  |  96  |  7.9[L]  ----------------------------<  112[H]  3.7   |  30.0[H]  |  0.66    Ca    8.5      07 Oct 2024 04:50  Phos  2.6     10-06    TPro  5.9[L]  /  Alb  3.1[L]  /  TBili  0.4  /  DBili  x   /  AST  15  /  ALT  12  /  AlkPhos  46  10-06      PHYSICAL EXAM:    Vital Signs Last 24 Hrs  T(C): 36.6 (07 Oct 2024 08:47), Max: 37.3 (06 Oct 2024 19:30)  T(F): 97.8 (07 Oct 2024 08:47), Max: 99.1 (06 Oct 2024 19:30)  HR: 74 (07 Oct 2024 08:47) (74 - 101)  BP: 143/84 (07 Oct 2024 08:47) (105/71 - 145/83)  BP(mean): --  RR: 20 (07 Oct 2024 08:47) (18 - 20)  SpO2: 91% (07 Oct 2024 08:47) (91% - 98%)    Parameters below as of 07 Oct 2024 08:47  Patient On (Oxygen Delivery Method): room air      Daily     Daily     Appearance: Alert, responsive, in no acute distress.    Neurological: Sensation is grossly intact to light touch. 5/5 motor function of all extremities. No focal deficits or weaknesses found.    Skin: no rash on visible skin. Skin is clean, dry and intact. No bleeding. No abrasions. No ulcerations. Dressings remain clean dry and intact.     Vascular: 2+ distal pulses. Cap refill < 2 sec. No signs of venous   insufficiency   or stasis. No extremity ulcerations. No cyanosis.      A/P:  Pt is a  59y Male POD#4 s/p Lumbar laminectomy and fusion.    PLAN:   * PT/ gait and stair training  Discharge planning.   Home today once cleared by PT
Pt Name: TYRONE ARCOS    MRN: [id number]      Patient is POD#2 s/p L2-3 lumbar laminectomy and fusion. Comfortable in bed, no new orthopedic complaints at this time.         PAST MEDICAL & SURGICAL HISTORY:  PAST MEDICAL & SURGICAL HISTORY:  Lumbar herniated disc      Hypertension      Lymphedema of leg      ESPINOZA on CPAP      Heart valve disorder      Disc displacement, lumbar      Lumbar stenosis      History of laminectomy      History of spinal fusion          Allergies: No Known Allergies      Medications: acetaminophen     Tablet .. 650 milliGRAM(s) Oral every 6 hours  ALPRAZolam 1 milliGRAM(s) Oral daily PRN  ARIPiprazole 5 milliGRAM(s) Oral daily  ceFAZolin  Injectable. 2000 milliGRAM(s) IV Push every 8 hours  fentaNYL    Injectable 25 MICROGram(s) IV Push every 5 minutes PRN  finasteride 5 milliGRAM(s) Oral daily  FLUoxetine 40 milliGRAM(s) Oral daily  furosemide    Tablet 40 milliGRAM(s) Oral two times a day  gabapentin 300 milliGRAM(s) Oral three times a day  HYDROmorphone PCA (1 mG/mL) 30 milliLiter(s) PCA Continuous PCA Continuous  influenza   Vaccine 0.5 milliLiter(s) IntraMuscular once  lactated ringers. 500 milliLiter(s) IV Continuous <Continuous>  losartan 25 milliGRAM(s) Oral daily  methocarbamol 750 milliGRAM(s) Oral every 8 hours PRN  montelukast 10 milliGRAM(s) Oral daily  naloxone Injectable 0.1 milliGRAM(s) IV Push every 3 minutes PRN  ondansetron   Disintegrating Tablet 4 milliGRAM(s) Oral every 8 hours PRN  ondansetron Injectable 4 milliGRAM(s) IV Push every 6 hours PRN  pantoprazole    Tablet 40 milliGRAM(s) Oral before breakfast  potassium chloride    Tablet ER 10 milliEquivalent(s) Oral daily  potassium chloride  10 mEq/100 mL IVPB 10 milliEquivalent(s) IV Intermittent every 1 hour  potassium phosphate / sodium phosphate Tablet (K-PHOS No. 2) 1 Tablet(s) Oral three times a day  potassium phosphate IVPB 15 milliMole(s) IV Intermittent once  senna 2 Tablet(s) Oral at bedtime  tamsulosin 0.4 milliGRAM(s) Oral at bedtime  zolpidem 5 milliGRAM(s) Oral at bedtime PRN  zolpidem 5 milliGRAM(s) Oral at bedtime PRN        Ambulation: With Walker                        11.8   8.54  )-----------( 141      ( 05 Oct 2024 05:47 )             33.6     10-05    132[L]  |  92[L]  |  8.1  ----------------------------<  113[H]  3.0[L]   |  31.0[H]  |  0.49[L]    Ca    8.1[L]      05 Oct 2024 05:47  Phos  1.8     10-05  Mg     2.0     10-05    TPro  5.5[L]  /  Alb  3.2[L]  /  TBili  0.6  /  DBili  x   /  AST  16  /  ALT  16  /  AlkPhos  49  10-04      PHYSICAL EXAM:    Vital Signs Last 24 Hrs  T(C): 36.5 (05 Oct 2024 10:54), Max: 36.9 (04 Oct 2024 15:53)  T(F): 97.7 (05 Oct 2024 10:54), Max: 98.4 (04 Oct 2024 15:53)  HR: 73 (05 Oct 2024 13:20) (70 - 94)  BP: 107/70 (05 Oct 2024 13:20) (106/64 - 125/70)  BP(mean): --  RR: 18 (05 Oct 2024 10:54) (14 - 18)  SpO2: 94% (05 Oct 2024 10:54) (92% - 98%)    Parameters below as of 05 Oct 2024 10:54  Patient On (Oxygen Delivery Method): room air      Daily     Daily     Appearance: Alert, responsive, in no acute distress.    Neurological: Sensation is grossly intact to light touch. 5/5 motor function of all extremities. No focal deficits or weaknesses found.    Skin: no rash on visible skin. Skin is clean, dry and intact. No bleeding. No abrasions. No ulcerations.    Vascular: 2+ distal pulses. Cap refill < 2 sec. No signs of venous   insufficiency   or stasis. No extremity ulcerations. No cyanosis.    Musculoskeletal:  Surgical dressings rolled up on lumbar spine.. Drains assessed for removal. Deep NANO drain not maintaining suction. suture remains intact, although drain was accidentally pulled out by patient.    Hemovac drain remains in place.  Motor and sensory remain intact.   A/P:  Pt is a  59y Male POD#1 s/p lumbar laminectomy and fusion.     PLAN:   * monitor drain output.   Pain control  WBAT  
TYRONE ARCOS    [id number]    59y      Male    Patient is a 59y old  Male who presents with a chief complaint of s/p lumbar laminectomy (04 Oct 2024 14:23)      INTERVAL HPI/OVERNIGHT EVENTS:    Patient's pain is well controlled, denies fever, chills, chest pain, sob    Vital Signs Last 24 Hrs  T(C): 36.5 (05 Oct 2024 10:54), Max: 36.9 (04 Oct 2024 13:15)  T(F): 97.7 (05 Oct 2024 10:54), Max: 98.4 (04 Oct 2024 13:15)  HR: 94 (05 Oct 2024 10:54) (70 - 94)  BP: 107/67 (05 Oct 2024 10:54) (104/- - 125/70  RR: 18 (05 Oct 2024 10:54) (14 - 18)  SpO2: 94% (05 Oct 2024 10:54) (92% - 98%)    Parameters below as of 05 Oct 2024 10:54  Patient On (Oxygen Delivery Method): room air        PHYSICAL EXAM:    GENERAL: Middle age male looking comfortable   HEENT: PERRL, +EOMI  NECK: soft, Supple, No JVD  CHEST/LUNG: Clear to auscultate bilaterally; No wheezing  HEART: S1S2+, Regular rate and rhythm; No murmurs  ABDOMEN: Soft, Nontender, Nondistended; Bowel sounds present  EXTREMITIES:  1+ Peripheral Pulses, No edema  SKIN: No rashes or lesions  NEURO: AAOX3  PSYCH: normal mood  Lower back with clean dressings on       LABS:                        11.8   8.54  )-----------( 141      ( 05 Oct 2024 05:47 )             33.6     10-05    132[L]  |  92[L]  |  8.1  ----------------------------<  113[H]  3.0[L]   |  31.0[H]  |  0.49[L]    Ca    8.1[L]      05 Oct 2024 05:47  Phos  1.8     10-05  Mg     2.0     10-05    TPro  5.5[L]  /  Alb  3.2[L]  /  TBili  0.6  /  DBili  x   /  AST  16  /  ALT  16  /  AlkPhos  49  10-04          I&O's Summary    04 Oct 2024 07:01  -  05 Oct 2024 07:00  --------------------------------------------------------  IN: 920 mL / OUT: 5870 mL / NET: -4950 mL        MEDICATIONS  (STANDING):  acetaminophen     Tablet .. 650 milliGRAM(s) Oral every 6 hours  ARIPiprazole 5 milliGRAM(s) Oral daily  ceFAZolin  Injectable. 2000 milliGRAM(s) IV Push every 8 hours  finasteride 5 milliGRAM(s) Oral daily  FLUoxetine 40 milliGRAM(s) Oral daily  furosemide    Tablet 40 milliGRAM(s) Oral two times a day  gabapentin 300 milliGRAM(s) Oral three times a day  HYDROmorphone PCA (1 mG/mL) 30 milliLiter(s) PCA Continuous PCA Continuous  influenza   Vaccine 0.5 milliLiter(s) IntraMuscular once  lactated ringers. 500 milliLiter(s) (5 mL/Hr) IV Continuous <Continuous>  losartan 25 milliGRAM(s) Oral daily  montelukast 10 milliGRAM(s) Oral daily  pantoprazole    Tablet 40 milliGRAM(s) Oral before breakfast  potassium chloride    Tablet ER 10 milliEquivalent(s) Oral daily  senna 2 Tablet(s) Oral at bedtime  tamsulosin 0.4 milliGRAM(s) Oral at bedtime    MEDICATIONS  (PRN):  ALPRAZolam 1 milliGRAM(s) Oral daily PRN anxiety  fentaNYL    Injectable 25 MICROGram(s) IV Push every 5 minutes PRN Moderate Pain (4 - 6)  methocarbamol 750 milliGRAM(s) Oral every 8 hours PRN Muscle Spasm  naloxone Injectable 0.1 milliGRAM(s) IV Push every 3 minutes PRN For ANY of the following changes in patient status:  A. RR LESS THAN 10 breaths per minute, B. Oxygen saturation LESS THAN 90%, C. Sedation score of 6  ondansetron   Disintegrating Tablet 4 milliGRAM(s) Oral every 8 hours PRN Nausea and/or Vomiting  ondansetron Injectable 4 milliGRAM(s) IV Push every 6 hours PRN Nausea  zolpidem 5 milliGRAM(s) Oral at bedtime PRN Insomnia  zolpidem 5 milliGRAM(s) Oral at bedtime PRN Insomnia        
TYRONE ARCOS    [id number]    59y      Male    Patient is a 59y old  Male who presents with a chief complaint of lumbar pain  (07 Oct 2024 09:57)      INTERVAL HPI/OVERNIGHT EVENTS:    Patient is doing ok, denies fever, chills, chest pain, sob, dizziness     Vital Signs Last 24 Hrs  T(C): 36.6 (07 Oct 2024 08:47), Max: 37.3 (06 Oct 2024 19:30)  T(F): 97.8 (07 Oct 2024 08:47), Max: 99.1 (06 Oct 2024 19:30)  HR: 74 (07 Oct 2024 08:47) (74 - 101)  BP: 143/84 (07 Oct 2024 08:47) (105/71 - 145/83)  BP(mean): --  RR: 20 (07 Oct 2024 08:47) (18 - 20)  SpO2: 91% (07 Oct 2024 08:47) (91% - 98%)    Parameters below as of 07 Oct 2024 08:47  Patient On (Oxygen Delivery Method): room air        PHYSICAL EXAM:      GENERAL: Middle age male looking comfortable   HEENT: PERRL, +EOMI  NECK: soft, Supple, No JVD  CHEST/LUNG: Clear to auscultate bilaterally; No wheezing  HEART: S1S2+, Regular rate and rhythm; No murmurs  ABDOMEN: Soft, Nontender, Nondistended; Bowel sounds present  EXTREMITIES:  1+ Peripheral Pulses, No edema  SKIN: No rashes or lesions  NEURO: AAOX3  PSYCH: normal mood  Lower back with clean dressings on     LABS:                        12.1   8.55  )-----------( 174      ( 07 Oct 2024 04:50 )             35.5     10-07    135  |  96  |  7.9[L]  ----------------------------<  112[H]  3.7   |  30.0[H]  |  0.66    Ca    8.5      07 Oct 2024 04:50  Phos  2.6     10-06    TPro  5.9[L]  /  Alb  3.1[L]  /  TBili  0.4  /  DBili  x   /  AST  15  /  ALT  12  /  AlkPhos  46  10-06      I&O's Summary    06 Oct 2024 07:01  -  07 Oct 2024 07:00  --------------------------------------------------------  IN: 1320 mL / OUT: 4500 mL / NET: -3180 mL        MEDICATIONS  (STANDING):  acetaminophen     Tablet .. 650 milliGRAM(s) Oral every 6 hours  ARIPiprazole 5 milliGRAM(s) Oral daily  finasteride 5 milliGRAM(s) Oral daily  FLUoxetine 40 milliGRAM(s) Oral daily  furosemide    Tablet 40 milliGRAM(s) Oral two times a day  gabapentin 300 milliGRAM(s) Oral three times a day  influenza   Vaccine 0.5 milliLiter(s) IntraMuscular once  lactated ringers. 500 milliLiter(s) (5 mL/Hr) IV Continuous <Continuous>  losartan 25 milliGRAM(s) Oral daily  montelukast 10 milliGRAM(s) Oral daily  pantoprazole    Tablet 40 milliGRAM(s) Oral before breakfast  potassium chloride    Tablet ER 10 milliEquivalent(s) Oral daily  potassium phosphate / sodium phosphate Tablet (K-PHOS No. 2) 1 Tablet(s) Oral three times a day  senna 2 Tablet(s) Oral at bedtime  tamsulosin 0.4 milliGRAM(s) Oral at bedtime    MEDICATIONS  (PRN):  ALPRAZolam 1 milliGRAM(s) Oral daily PRN anxiety  fentaNYL    Injectable 25 MICROGram(s) IV Push every 5 minutes PRN Moderate Pain (4 - 6)  HYDROmorphone   Tablet 4 milliGRAM(s) Oral every 3 hours PRN Severe Pain (7 - 10)  methocarbamol 750 milliGRAM(s) Oral every 8 hours PRN Muscle Spasm  naloxone Injectable 0.1 milliGRAM(s) IV Push every 3 minutes PRN For ANY of the following changes in patient status:  A. RR LESS THAN 10 breaths per minute, B. Oxygen saturation LESS THAN 90%, C. Sedation score of 6  ondansetron   Disintegrating Tablet 4 milliGRAM(s) Oral every 8 hours PRN Nausea and/or Vomiting  ondansetron Injectable 4 milliGRAM(s) IV Push every 6 hours PRN Nausea  oxyCODONE    IR 5 milliGRAM(s) Oral every 3 hours PRN Mild Pain (1 - 3)  oxyCODONE    IR 10 milliGRAM(s) Oral every 3 hours PRN Moderate Pain (4 - 6)  zolpidem 5 milliGRAM(s) Oral at bedtime PRN Insomnia  zolpidem 5 milliGRAM(s) Oral at bedtime PRN Insomnia        
[id number]  TYRONE ARCOS    Patient seen and eval at bedside. Patient has no complaints, states he feels 45-50% improved regarding his symptoms preop. Patient denies incontinence, saddle anesthesia, difficulty breathing, swallowing. Denies CP, SOB, dizziness. Denies motor/sensory changes.    T(C): 36.7 (10-04-24 @ 04:40), Max: 37.1 (10-03-24 @ 17:10)  HR: 74 (10-04-24 @ 04:40) (71 - 92)  BP: 112/68 (10-04-24 @ 04:40) (105/61 - 152/83)  RR: 18 (10-04-24 @ 04:40) (12 - 18)  SpO2: 99% (10-04-24 @ 04:40) (94% - 100%)    PE: NAD, alert awake  Back: Dressing saturated serosangenous drainage, HV drains intact, incision heaing well, no erythema or s/o infx, new dressing with xeroform placed. sutures intact.  HV drain output over 12 hrs = #1 60ccs, #2 250 ccs  Motor exam:            Lower extremity                                HF         KE          TA       EHL         GS                                                 R        4+/5        4+/5    5/5       5/5         5/5                                               L         5/5        5/5       5/5       5/5          5/5    SILT L2-S1 intact B/L, DP pulses 2+ B/L  Calf soft, NT B/L    A/P: s/p lumbar lami/fusion L2-3 POD#1  Pain control  DVT propx: SCDs  PT - WBAT  Cont drain - monitor output  Bowel regimen  Med following - d/w med regarding Na+ and K+ - potassium repleted, Serum BMP, Mg, Phos, urine osmolaility and lytes ordered.  Dispo: Home pending PT and med clearance poss tomorrow
ORTHO-SPINE POST-OP PROGRESS NOTE:      [id number]    TYRONE ARCOS      PROCEDURE:  lumbar posterior spinal fusion.     DOS: 10/3/24 - POD #0.      SUBJECTIVE: 59y Patient seen and examined at bedside in PACU. Patient endorses moderate discomfort that is otherwise controlled by pain medications. Patient denies acute sensory or motor changes. No acute orthopedic complaints expressed at this time.         acetaminophen     Tablet .. 650 milliGRAM(s) Oral every 6 hours  acetaminophen   IVPB .. 1000 milliGRAM(s) IV Intermittent once  ALPRAZolam 1 milliGRAM(s) Oral daily PRN  ARIPiprazole 5 milliGRAM(s) Oral daily  ceFAZolin  Injectable. 2000 milliGRAM(s) IV Push every 8 hours  fentaNYL    Injectable 25 MICROGram(s) IV Push every 5 minutes PRN  finasteride 5 milliGRAM(s) Oral daily  FLUoxetine 40 milliGRAM(s) Oral daily  furosemide    Tablet 40 milliGRAM(s) Oral two times a day  gabapentin 300 milliGRAM(s) Oral three times a day  HYDROmorphone  Injectable 0.5 milliGRAM(s) IV Push every 10 minutes PRN  HYDROmorphone PCA (1 mG/mL) 30 milliLiter(s) PCA Continuous PCA Continuous  lactated ringers. 1000 milliLiter(s) IV Continuous <Continuous>  losartan 25 milliGRAM(s) Oral daily  methocarbamol 750 milliGRAM(s) Oral every 8 hours PRN  montelukast 10 milliGRAM(s) Oral daily  naloxone Injectable 0.1 milliGRAM(s) IV Push every 3 minutes PRN  ondansetron   Disintegrating Tablet 4 milliGRAM(s) Oral every 8 hours PRN  ondansetron Injectable 4 milliGRAM(s) IV Push every 6 hours PRN  pantoprazole    Tablet 40 milliGRAM(s) Oral before breakfast  potassium chloride    Tablet ER 10 milliEquivalent(s) Oral daily  senna 2 Tablet(s) Oral at bedtime  tamsulosin 0.4 milliGRAM(s) Oral at bedtime  zolpidem 5 milliGRAM(s) Oral at bedtime PRN  zolpidem 5 milliGRAM(s) Oral at bedtime PRN        T(C): 37.1 (10-03-24 @ 17:10), Max: 37.1 (10-03-24 @ 17:10)  HR: 71 (10-03-24 @ 10:49) (71 - 71)  BP: 152/83 (10-03-24 @ 10:49) (152/83 - 152/83)  RR: 16 (10-03-24 @ 10:49) (16 - 16)  SpO2: 97% (10-03-24 @ 10:49) (97% - 97%)  Wt(kg): --      PHYSICAL EXAM:     Constitutional: Alert, responsive, in no acute distress.     Abdominal: soft and supple, non distended.      Spine:            Sensation: normal to light touch L2-S1 bilaterally. No focal deficits noted of the lower extremities.                Motor exam: 5/5 hip flexion, knee flexion and ankle plantar and dorsiflexion. No focal weaknesses noted.                                                                                                    A/P:  59M Male S/P lumbar fusion POD#0.      -  Pain control; PCA ordered.  -  DVT ppx: SCDs.   -  PT.  -  Weight bearing status: WBAT.   - Monitor drains.  - Abx until drains out.
Pt Name: TYRONE ARCOS    MRN: [id number]    LATE CHART ENTRY      Patient is pod#3 S/P l2-3 LUMBAR FUSION      PAST MEDICAL & SURGICAL HISTORY:  PAST MEDICAL & SURGICAL HISTORY:  Lumbar herniated disc      Hypertension      Lymphedema of leg      ESPINOZA on CPAP      Heart valve disorder      Disc displacement, lumbar      Lumbar stenosis      History of laminectomy      History of spinal fusion          Allergies: No Known Allergies      Medications: acetaminophen     Tablet .. 650 milliGRAM(s) Oral every 6 hours  ALPRAZolam 1 milliGRAM(s) Oral daily PRN  ARIPiprazole 5 milliGRAM(s) Oral daily  fentaNYL    Injectable 25 MICROGram(s) IV Push every 5 minutes PRN  finasteride 5 milliGRAM(s) Oral daily  FLUoxetine 40 milliGRAM(s) Oral daily  furosemide    Tablet 40 milliGRAM(s) Oral two times a day  gabapentin 300 milliGRAM(s) Oral three times a day  HYDROmorphone   Tablet 4 milliGRAM(s) Oral every 3 hours PRN  influenza   Vaccine 0.5 milliLiter(s) IntraMuscular once  lactated ringers. 500 milliLiter(s) IV Continuous <Continuous>  losartan 25 milliGRAM(s) Oral daily  methocarbamol 750 milliGRAM(s) Oral every 8 hours PRN  montelukast 10 milliGRAM(s) Oral daily  naloxone Injectable 0.1 milliGRAM(s) IV Push every 3 minutes PRN  ondansetron   Disintegrating Tablet 4 milliGRAM(s) Oral every 8 hours PRN  ondansetron Injectable 4 milliGRAM(s) IV Push every 6 hours PRN  oxyCODONE    IR 10 milliGRAM(s) Oral every 3 hours PRN  oxyCODONE    IR 5 milliGRAM(s) Oral every 3 hours PRN  pantoprazole    Tablet 40 milliGRAM(s) Oral before breakfast  potassium chloride    Tablet ER 10 milliEquivalent(s) Oral daily  potassium phosphate / sodium phosphate Tablet (K-PHOS No. 2) 1 Tablet(s) Oral three times a day  senna 2 Tablet(s) Oral at bedtime  tamsulosin 0.4 milliGRAM(s) Oral at bedtime  zolpidem 5 milliGRAM(s) Oral at bedtime PRN  zolpidem 5 milliGRAM(s) Oral at bedtime PRN        Ambulation: With Walker                           11.9   8.68  )-----------( 148      ( 06 Oct 2024 05:50 )             33.8     10-06    135  |  96  |  9.7  ----------------------------<  107[H]  3.4[L]   |  29.0  |  0.64    Ca    8.1[L]      06 Oct 2024 05:50  Phos  2.6     10-06  Mg     2.0     10-05    TPro  5.9[L]  /  Alb  3.1[L]  /  TBili  0.4  /  DBili  x   /  AST  15  /  ALT  12  /  AlkPhos  46  10-06      PHYSICAL EXAM:    Vital Signs Last 24 Hrs  T(C): 36.8 (06 Oct 2024 08:57), Max: 37 (05 Oct 2024 22:15)  T(F): 98.2 (06 Oct 2024 08:57), Max: 98.6 (05 Oct 2024 22:15)  HR: 71 (06 Oct 2024 08:57) (67 - 84)  BP: 117/72 (06 Oct 2024 08:57) (92/60 - 152/82)  BP(mean): --  RR: 18 (06 Oct 2024 08:57) (18 - 18)  SpO2: 93% (06 Oct 2024 08:57) (93% - 99%)    Parameters below as of 06 Oct 2024 08:57  Patient On (Oxygen Delivery Method): room air      Daily     Daily     Appearance: Alert, responsive, in no acute distress.    Neurological: Sensation is grossly intact to light touch. 5/5 motor function of all extremities. No focal deficits or weaknesses found.    Skin: no rash on visible skin. Skin is clean, dry and intact. No bleeding. No abrasions. No ulcerations.    Vascular: 2+ distal pulses. Cap refill < 2 sec. No signs of venous   insufficiency   or stasis. No extremity ulcerations. No cyanosis.    DRAIN REMOVED  patient tolerated drain removal. New dressings placed.   PCA discontinued, oral pain medication ordered.     A/P:  Pt is a  59y MalePOD#3 L2-3 lami fusion  PLAN:   * WBAT  Pain control  discharge planning
TYRONE ARCOS    [id number]    59y      Male    Patient is a 59y old  Male who presents with a chief complaint of lumbar (06 Oct 2024 09:41)      INTERVAL HPI/OVERNIGHT EVENTS:    Patient is doing ok, denies fever, chills, chest pain, sob, dizziness     Vital Signs Last 24 Hrs  T(C): 36.8 (06 Oct 2024 08:57), Max: 37 (05 Oct 2024 22:15)  T(F): 98.2 (06 Oct 2024 08:57), Max: 98.6 (05 Oct 2024 22:15)  HR: 71 (06 Oct 2024 08:57) (67 - 84)  BP: 117/72 (06 Oct 2024 08:57) (92/60 - 152/82)  RR: 18 (06 Oct 2024 08:57) (18 - 18)  SpO2: 93% (06 Oct 2024 08:57) (93% - 99%)    Parameters below as of 06 Oct 2024 08:57  Patient On (Oxygen Delivery Method): room air        PHYSICAL EXAM:    GENERAL: Middle age male looking comfortable   HEENT: PERRL, +EOMI  NECK: soft, Supple, No JVD  CHEST/LUNG: Clear to auscultate bilaterally; No wheezing  HEART: S1S2+, Regular rate and rhythm; No murmurs  ABDOMEN: Soft, Nontender, Nondistended; Bowel sounds present  EXTREMITIES:  1+ Peripheral Pulses, No edema  SKIN: No rashes or lesions  NEURO: AAOX3  PSYCH: normal mood  Lower back with clean dressings on     LABS:                        11.9   8.68  )-----------( 148      ( 06 Oct 2024 05:50 )             33.8     10-06    135  |  96  |  9.7  ----------------------------<  107[H]  3.4[L]   |  29.0  |  0.64    Ca    8.1[L]      06 Oct 2024 05:50  Phos  2.6     10-06  Mg     2.0     10-05    TPro  5.9[L]  /  Alb  3.1[L]  /  TBili  0.4  /  DBili  x   /  AST  15  /  ALT  12  /  AlkPhos  46  10-06        I&O's Summary    05 Oct 2024 07:01  -  06 Oct 2024 07:00  --------------------------------------------------------  IN: 0 mL / OUT: 3285 mL / NET: -3285 mL    06 Oct 2024 07:01  -  06 Oct 2024 11:00  --------------------------------------------------------  IN: 360 mL / OUT: 500 mL / NET: -140 mL        MEDICATIONS  (STANDING):  acetaminophen     Tablet .. 650 milliGRAM(s) Oral every 6 hours  ARIPiprazole 5 milliGRAM(s) Oral daily  finasteride 5 milliGRAM(s) Oral daily  FLUoxetine 40 milliGRAM(s) Oral daily  furosemide    Tablet 40 milliGRAM(s) Oral two times a day  gabapentin 300 milliGRAM(s) Oral three times a day  HYDROmorphone PCA (1 mG/mL) 30 milliLiter(s) PCA Continuous PCA Continuous  influenza   Vaccine 0.5 milliLiter(s) IntraMuscular once  lactated ringers. 500 milliLiter(s) (5 mL/Hr) IV Continuous <Continuous>  losartan 25 milliGRAM(s) Oral daily  montelukast 10 milliGRAM(s) Oral daily  pantoprazole    Tablet 40 milliGRAM(s) Oral before breakfast  potassium chloride    Tablet ER 10 milliEquivalent(s) Oral daily  potassium phosphate / sodium phosphate Tablet (K-PHOS No. 2) 1 Tablet(s) Oral three times a day  senna 2 Tablet(s) Oral at bedtime  tamsulosin 0.4 milliGRAM(s) Oral at bedtime    MEDICATIONS  (PRN):  ALPRAZolam 1 milliGRAM(s) Oral daily PRN anxiety  fentaNYL    Injectable 25 MICROGram(s) IV Push every 5 minutes PRN Moderate Pain (4 - 6)  methocarbamol 750 milliGRAM(s) Oral every 8 hours PRN Muscle Spasm  naloxone Injectable 0.1 milliGRAM(s) IV Push every 3 minutes PRN For ANY of the following changes in patient status:  A. RR LESS THAN 10 breaths per minute, B. Oxygen saturation LESS THAN 90%, C. Sedation score of 6  ondansetron   Disintegrating Tablet 4 milliGRAM(s) Oral every 8 hours PRN Nausea and/or Vomiting  ondansetron Injectable 4 milliGRAM(s) IV Push every 6 hours PRN Nausea  zolpidem 5 milliGRAM(s) Oral at bedtime PRN Insomnia  zolpidem 5 milliGRAM(s) Oral at bedtime PRN Insomnia

## 2024-10-07 NOTE — DISCHARGE NOTE PROVIDER - HOSPITAL COURSE
The patient underwent a Lumbar laminectomy and fusion. on 10/3.  The patient received antibiotics consistent with  guidelines. The patient underwent the procedure and had no intra-operative complications. Post-operatively, the patient was seen by medicine and PT.  The patient received pain medications per orthopedic pain management pathway and the pain was appropriately controlled. The patient did not have any post-operative medical complications. The patient was discharged in stable condition. You were found to have Hyperglycemia during your stay.  Your diabetes may need closer control and improvement.  Please follow up with your PMD/Endocrinologist upon discharge.  Please follow up [facility] upon discharge.

## 2024-10-18 ENCOUNTER — APPOINTMENT (OUTPATIENT)
Dept: ORTHOPEDIC SURGERY | Facility: CLINIC | Age: 59
End: 2024-10-18
Payer: COMMERCIAL

## 2024-10-18 DIAGNOSIS — M48.062 SPINAL STENOSIS, LUMBAR REGION WITH NEUROGENIC CLAUDICATION: ICD-10-CM

## 2024-10-18 PROCEDURE — 99024 POSTOP FOLLOW-UP VISIT: CPT

## 2024-10-18 PROCEDURE — 72100 X-RAY EXAM L-S SPINE 2/3 VWS: CPT

## 2024-10-18 RX ORDER — MELOXICAM 15 MG/1
15 TABLET ORAL
Qty: 30 | Refills: 1 | Status: ACTIVE | COMMUNITY
Start: 2024-10-18 | End: 1900-01-01

## 2024-10-18 RX ORDER — GABAPENTIN 300 MG/1
300 CAPSULE ORAL 3 TIMES DAILY
Qty: 90 | Refills: 2 | Status: ACTIVE | COMMUNITY
Start: 2024-10-18 | End: 1900-01-01

## 2024-10-18 RX ORDER — OXYCODONE 10 MG/1
10 TABLET ORAL 4 TIMES DAILY
Qty: 120 | Refills: 0 | Status: ACTIVE | COMMUNITY
Start: 2024-10-18 | End: 1900-01-01

## 2024-11-13 ENCOUNTER — RX RENEWAL (OUTPATIENT)
Age: 59
End: 2024-11-13

## 2024-11-15 ENCOUNTER — APPOINTMENT (OUTPATIENT)
Dept: ORTHOPEDIC SURGERY | Facility: CLINIC | Age: 59
End: 2024-11-15
Payer: COMMERCIAL

## 2024-11-15 VITALS — WEIGHT: 315 LBS | BODY MASS INDEX: 41.75 KG/M2 | HEIGHT: 73 IN

## 2024-11-15 DIAGNOSIS — M48.062 SPINAL STENOSIS, LUMBAR REGION WITH NEUROGENIC CLAUDICATION: ICD-10-CM

## 2024-11-15 PROCEDURE — 99024 POSTOP FOLLOW-UP VISIT: CPT

## 2024-11-15 PROCEDURE — 72100 X-RAY EXAM L-S SPINE 2/3 VWS: CPT

## 2024-11-15 RX ORDER — OXYCODONE 10 MG/1
10 TABLET ORAL 4 TIMES DAILY
Qty: 120 | Refills: 0 | Status: ACTIVE | COMMUNITY
Start: 2024-11-15 | End: 1900-01-01

## 2024-12-13 ENCOUNTER — APPOINTMENT (OUTPATIENT)
Dept: ORTHOPEDIC SURGERY | Facility: CLINIC | Age: 59
End: 2024-12-13
Payer: COMMERCIAL

## 2024-12-13 VITALS — BODY MASS INDEX: 41.75 KG/M2 | WEIGHT: 315 LBS | HEIGHT: 73 IN

## 2024-12-13 DIAGNOSIS — M48.062 SPINAL STENOSIS, LUMBAR REGION WITH NEUROGENIC CLAUDICATION: ICD-10-CM

## 2024-12-13 PROCEDURE — 72100 X-RAY EXAM L-S SPINE 2/3 VWS: CPT

## 2024-12-13 PROCEDURE — 99024 POSTOP FOLLOW-UP VISIT: CPT

## 2024-12-13 RX ORDER — GABAPENTIN 300 MG/1
300 CAPSULE ORAL 3 TIMES DAILY
Qty: 90 | Refills: 2 | Status: ACTIVE | COMMUNITY
Start: 2024-12-13 | End: 1900-01-01

## 2024-12-13 RX ORDER — MELOXICAM 15 MG/1
15 TABLET ORAL DAILY
Qty: 30 | Refills: 2 | Status: ACTIVE | COMMUNITY
Start: 2024-12-13 | End: 1900-01-01

## 2024-12-13 RX ORDER — OXYCODONE 10 MG/1
10 TABLET ORAL 4 TIMES DAILY
Qty: 120 | Refills: 0 | Status: ACTIVE | COMMUNITY
Start: 2024-12-13 | End: 1900-01-01

## 2025-01-17 ENCOUNTER — APPOINTMENT (OUTPATIENT)
Dept: ORTHOPEDIC SURGERY | Facility: CLINIC | Age: 60
End: 2025-01-17
Payer: COMMERCIAL

## 2025-01-17 DIAGNOSIS — M54.12 RADICULOPATHY, CERVICAL REGION: ICD-10-CM

## 2025-01-17 DIAGNOSIS — M48.062 SPINAL STENOSIS, LUMBAR REGION WITH NEUROGENIC CLAUDICATION: ICD-10-CM

## 2025-01-17 PROCEDURE — 99214 OFFICE O/P EST MOD 30 MIN: CPT

## 2025-01-17 RX ORDER — OXYCODONE 10 MG/1
10 TABLET ORAL 4 TIMES DAILY
Qty: 120 | Refills: 0 | Status: ACTIVE | COMMUNITY
Start: 2025-01-17 | End: 1900-01-01

## 2025-02-18 ENCOUNTER — APPOINTMENT (OUTPATIENT)
Dept: PAIN MANAGEMENT | Facility: CLINIC | Age: 60
End: 2025-02-18
Payer: COMMERCIAL

## 2025-02-18 VITALS — BODY MASS INDEX: 41.75 KG/M2 | WEIGHT: 315 LBS | HEIGHT: 73 IN

## 2025-02-18 DIAGNOSIS — Z98.1 ARTHRODESIS STATUS: ICD-10-CM

## 2025-02-18 DIAGNOSIS — M48.02 SPINAL STENOSIS, CERVICAL REGION: ICD-10-CM

## 2025-02-18 DIAGNOSIS — M54.12 RADICULOPATHY, CERVICAL REGION: ICD-10-CM

## 2025-02-18 PROCEDURE — 99213 OFFICE O/P EST LOW 20 MIN: CPT

## 2025-02-18 RX ORDER — TIRZEPATIDE 5 MG/.5ML
5 INJECTION, SOLUTION SUBCUTANEOUS
Refills: 0 | Status: ACTIVE | COMMUNITY

## 2025-02-21 ENCOUNTER — APPOINTMENT (OUTPATIENT)
Dept: ORTHOPEDIC SURGERY | Facility: CLINIC | Age: 60
End: 2025-02-21
Payer: COMMERCIAL

## 2025-02-21 VITALS — WEIGHT: 315 LBS | BODY MASS INDEX: 41.75 KG/M2 | HEIGHT: 73 IN

## 2025-02-21 DIAGNOSIS — M48.062 SPINAL STENOSIS, LUMBAR REGION WITH NEUROGENIC CLAUDICATION: ICD-10-CM

## 2025-02-21 PROCEDURE — 99213 OFFICE O/P EST LOW 20 MIN: CPT

## 2025-02-21 RX ORDER — OXYCODONE 10 MG/1
10 TABLET ORAL 4 TIMES DAILY
Qty: 120 | Refills: 0 | Status: ACTIVE | COMMUNITY
Start: 2025-02-21 | End: 1900-01-01

## 2025-03-07 ENCOUNTER — APPOINTMENT (OUTPATIENT)
Dept: PAIN MANAGEMENT | Facility: CLINIC | Age: 60
End: 2025-03-07

## 2025-03-21 ENCOUNTER — APPOINTMENT (OUTPATIENT)
Dept: ORTHOPEDIC SURGERY | Facility: CLINIC | Age: 60
End: 2025-03-21

## 2025-03-21 DIAGNOSIS — M48.062 SPINAL STENOSIS, LUMBAR REGION WITH NEUROGENIC CLAUDICATION: ICD-10-CM

## 2025-03-21 PROCEDURE — 99214 OFFICE O/P EST MOD 30 MIN: CPT | Mod: 25

## 2025-03-21 PROCEDURE — 72100 X-RAY EXAM L-S SPINE 2/3 VWS: CPT

## 2025-03-21 RX ORDER — OXYCODONE 10 MG/1
10 TABLET ORAL 4 TIMES DAILY
Qty: 120 | Refills: 0 | Status: ACTIVE | COMMUNITY
Start: 2025-03-21 | End: 1900-01-01

## 2025-03-21 RX ORDER — MELOXICAM 15 MG/1
15 TABLET ORAL
Qty: 30 | Refills: 2 | Status: ACTIVE | COMMUNITY
Start: 2025-03-21 | End: 1900-01-01

## 2025-03-21 RX ORDER — GABAPENTIN 300 MG/1
300 CAPSULE ORAL 3 TIMES DAILY
Qty: 90 | Refills: 2 | Status: ACTIVE | COMMUNITY
Start: 2025-03-21 | End: 1900-01-01

## 2025-03-28 ENCOUNTER — APPOINTMENT (OUTPATIENT)
Dept: PAIN MANAGEMENT | Facility: CLINIC | Age: 60
End: 2025-03-28
Payer: COMMERCIAL

## 2025-03-28 DIAGNOSIS — M54.12 RADICULOPATHY, CERVICAL REGION: ICD-10-CM

## 2025-03-28 PROCEDURE — 62321 NJX INTERLAMINAR CRV/THRC: CPT

## 2025-04-18 ENCOUNTER — APPOINTMENT (OUTPATIENT)
Dept: ORTHOPEDIC SURGERY | Facility: CLINIC | Age: 60
End: 2025-04-18

## 2025-04-18 DIAGNOSIS — M48.02 SPINAL STENOSIS, CERVICAL REGION: ICD-10-CM

## 2025-04-18 DIAGNOSIS — M48.062 SPINAL STENOSIS, LUMBAR REGION WITH NEUROGENIC CLAUDICATION: ICD-10-CM

## 2025-04-18 PROCEDURE — 99213 OFFICE O/P EST LOW 20 MIN: CPT

## 2025-04-18 RX ORDER — OXYCODONE 10 MG/1
10 TABLET ORAL 4 TIMES DAILY
Qty: 120 | Refills: 0 | Status: ACTIVE | COMMUNITY
Start: 2025-04-18 | End: 1900-01-01

## 2025-05-01 ENCOUNTER — APPOINTMENT (OUTPATIENT)
Dept: PAIN MANAGEMENT | Facility: CLINIC | Age: 60
End: 2025-05-01

## 2025-05-01 VITALS — HEIGHT: 73 IN | BODY MASS INDEX: 39.36 KG/M2 | WEIGHT: 297 LBS

## 2025-05-01 DIAGNOSIS — M79.18 MYALGIA, OTHER SITE: ICD-10-CM

## 2025-05-01 DIAGNOSIS — M54.12 RADICULOPATHY, CERVICAL REGION: ICD-10-CM

## 2025-05-01 DIAGNOSIS — M47.812 SPONDYLOSIS W/OUT MYELOPATHY OR RADICULOPATHY, CERVICAL REGION: ICD-10-CM

## 2025-05-01 PROCEDURE — 99214 OFFICE O/P EST MOD 30 MIN: CPT

## 2025-05-16 ENCOUNTER — APPOINTMENT (OUTPATIENT)
Dept: ORTHOPEDIC SURGERY | Facility: CLINIC | Age: 60
End: 2025-05-16

## 2025-05-16 VITALS — WEIGHT: 293 LBS | BODY MASS INDEX: 38.83 KG/M2 | HEIGHT: 73 IN

## 2025-05-16 DIAGNOSIS — M48.062 SPINAL STENOSIS, LUMBAR REGION WITH NEUROGENIC CLAUDICATION: ICD-10-CM

## 2025-05-16 DIAGNOSIS — M54.12 RADICULOPATHY, CERVICAL REGION: ICD-10-CM

## 2025-05-16 PROCEDURE — 99213 OFFICE O/P EST LOW 20 MIN: CPT

## 2025-05-16 RX ORDER — OXYCODONE 10 MG/1
10 TABLET ORAL 4 TIMES DAILY
Qty: 120 | Refills: 0 | Status: ACTIVE | COMMUNITY
Start: 2025-05-16 | End: 1900-01-01

## 2025-06-06 ENCOUNTER — APPOINTMENT (OUTPATIENT)
Dept: PAIN MANAGEMENT | Facility: CLINIC | Age: 60
End: 2025-06-06
Payer: COMMERCIAL

## 2025-06-06 PROCEDURE — 64491 INJ PARAVERT F JNT C/T 2 LEV: CPT | Mod: LT,59

## 2025-06-06 PROCEDURE — 64490 INJ PARAVERT F JNT C/T 1 LEV: CPT | Mod: LT

## 2025-06-06 PROCEDURE — J3490M: CUSTOM

## 2025-06-20 ENCOUNTER — APPOINTMENT (OUTPATIENT)
Dept: ORTHOPEDIC SURGERY | Facility: CLINIC | Age: 60
End: 2025-06-20

## 2025-06-20 PROCEDURE — 99214 OFFICE O/P EST MOD 30 MIN: CPT

## 2025-06-20 RX ORDER — OXYCODONE 10 MG/1
10 TABLET ORAL 4 TIMES DAILY
Qty: 120 | Refills: 0 | Status: ACTIVE | COMMUNITY
Start: 2025-06-20 | End: 1900-01-01

## 2025-06-20 RX ORDER — GABAPENTIN 300 MG/1
300 CAPSULE ORAL 3 TIMES DAILY
Qty: 90 | Refills: 1 | Status: ACTIVE | COMMUNITY
Start: 2025-06-20 | End: 1900-01-01

## 2025-06-20 RX ORDER — MELOXICAM 15 MG/1
15 TABLET ORAL
Qty: 30 | Refills: 1 | Status: ACTIVE | COMMUNITY
Start: 2025-06-20 | End: 1900-01-01

## 2025-06-26 ENCOUNTER — APPOINTMENT (OUTPATIENT)
Dept: PAIN MANAGEMENT | Facility: CLINIC | Age: 60
End: 2025-06-26

## 2025-06-26 VITALS — BODY MASS INDEX: 38.57 KG/M2 | WEIGHT: 291 LBS | HEIGHT: 73 IN

## 2025-06-26 PROCEDURE — 99213 OFFICE O/P EST LOW 20 MIN: CPT

## 2025-07-09 ENCOUNTER — APPOINTMENT (OUTPATIENT)
Dept: ORTHOPEDIC SURGERY | Facility: CLINIC | Age: 60
End: 2025-07-09

## 2025-07-09 VITALS — BODY MASS INDEX: 38.43 KG/M2 | HEIGHT: 73 IN | WEIGHT: 290 LBS

## 2025-07-09 PROCEDURE — 99214 OFFICE O/P EST MOD 30 MIN: CPT

## 2025-07-09 RX ORDER — OXYCODONE 10 MG/1
10 TABLET ORAL 4 TIMES DAILY
Qty: 120 | Refills: 0 | Status: ACTIVE | COMMUNITY
Start: 2025-07-09 | End: 1900-01-01

## 2025-07-23 ENCOUNTER — APPOINTMENT (OUTPATIENT)
Dept: PAIN MANAGEMENT | Facility: CLINIC | Age: 60
End: 2025-07-23
Payer: COMMERCIAL

## 2025-07-23 DIAGNOSIS — M47.812 SPONDYLOSIS W/OUT MYELOPATHY OR RADICULOPATHY, CERVICAL REGION: ICD-10-CM

## 2025-07-23 PROCEDURE — 64491 INJ PARAVERT F JNT C/T 2 LEV: CPT | Mod: LT,59

## 2025-07-23 PROCEDURE — 64490 INJ PARAVERT F JNT C/T 1 LEV: CPT | Mod: LT

## 2025-07-23 PROCEDURE — J3490M: CUSTOM

## 2025-08-06 ENCOUNTER — APPOINTMENT (OUTPATIENT)
Dept: ORTHOPEDIC SURGERY | Facility: CLINIC | Age: 60
End: 2025-08-06
Payer: COMMERCIAL

## 2025-08-06 VITALS — HEIGHT: 72 IN | BODY MASS INDEX: 39.28 KG/M2 | WEIGHT: 290 LBS

## 2025-08-06 DIAGNOSIS — M54.12 RADICULOPATHY, CERVICAL REGION: ICD-10-CM

## 2025-08-06 DIAGNOSIS — M48.062 SPINAL STENOSIS, LUMBAR REGION WITH NEUROGENIC CLAUDICATION: ICD-10-CM

## 2025-08-06 PROCEDURE — 99214 OFFICE O/P EST MOD 30 MIN: CPT

## 2025-08-06 RX ORDER — GABAPENTIN 300 MG/1
300 CAPSULE ORAL 3 TIMES DAILY
Qty: 90 | Refills: 2 | Status: ACTIVE | COMMUNITY
Start: 2025-08-06 | End: 1900-01-01

## 2025-08-06 RX ORDER — MELOXICAM 15 MG/1
15 TABLET ORAL DAILY
Qty: 30 | Refills: 1 | Status: ACTIVE | COMMUNITY
Start: 2025-08-06 | End: 1900-01-01

## 2025-08-06 RX ORDER — OXYCODONE 10 MG/1
10 TABLET ORAL 4 TIMES DAILY
Qty: 120 | Refills: 0 | Status: ACTIVE | COMMUNITY
Start: 2025-08-06 | End: 1900-01-01

## 2025-08-28 ENCOUNTER — APPOINTMENT (OUTPATIENT)
Dept: PAIN MANAGEMENT | Facility: CLINIC | Age: 60
End: 2025-08-28

## 2025-08-28 VITALS — WEIGHT: 296 LBS | HEIGHT: 72 IN | BODY MASS INDEX: 40.09 KG/M2

## 2025-08-28 DIAGNOSIS — Z98.1 ARTHRODESIS STATUS: ICD-10-CM

## 2025-08-28 DIAGNOSIS — M47.812 SPONDYLOSIS W/OUT MYELOPATHY OR RADICULOPATHY, CERVICAL REGION: ICD-10-CM

## 2025-08-28 DIAGNOSIS — M48.02 SPINAL STENOSIS, CERVICAL REGION: ICD-10-CM

## 2025-08-28 PROCEDURE — 99213 OFFICE O/P EST LOW 20 MIN: CPT

## 2025-09-05 ENCOUNTER — APPOINTMENT (OUTPATIENT)
Dept: ORTHOPEDIC SURGERY | Facility: CLINIC | Age: 60
End: 2025-09-05
Payer: COMMERCIAL

## 2025-09-05 DIAGNOSIS — M48.062 SPINAL STENOSIS, LUMBAR REGION WITH NEUROGENIC CLAUDICATION: ICD-10-CM

## 2025-09-05 DIAGNOSIS — M54.12 RADICULOPATHY, CERVICAL REGION: ICD-10-CM

## 2025-09-05 PROCEDURE — 99214 OFFICE O/P EST MOD 30 MIN: CPT

## 2025-09-05 RX ORDER — OXYCODONE 10 MG/1
10 TABLET ORAL 4 TIMES DAILY
Qty: 120 | Refills: 0 | Status: ACTIVE | COMMUNITY
Start: 2025-09-05 | End: 1900-01-01

## (undated) DEVICE — SUT VICRYL 1 36" CT-1 UNDYED

## (undated) DEVICE — Device

## (undated) DEVICE — SUT PERMAHAND 2-0 18" FSL

## (undated) DEVICE — DRAPE C ARM UNIVERSAL

## (undated) DEVICE — DRAPE INSTRUMENT POUCH 6.75" X 11"

## (undated) DEVICE — DRAPE C ARM C-ARMOUR

## (undated) DEVICE — MIDAS REX MR8 MATCH HEAD FLUTED LG BORE 3MM X 14CM

## (undated) DEVICE — GLV 8.5 PROTEXIS (WHITE)

## (undated) DEVICE — TAP MESA 2 SPINAL SYSTEM 5.5MM

## (undated) DEVICE — DRAPE SPLIT SHEET 77" X 108"

## (undated) DEVICE — DRAPE 3/4 SHEET 52X76"

## (undated) DEVICE — SUT VICRYL PLUS 2-0 18" CP-2 UNDYED (POP-OFF)

## (undated) DEVICE — WARMING BLANKET UPPER ADULT

## (undated) DEVICE — STAPLER SKIN PROXIMATE

## (undated) DEVICE — PREP DURAPREP 26CC

## (undated) DEVICE — MARKING PEN W RULER

## (undated) DEVICE — NDL SPINAL 18G X 3.5" (PINK)

## (undated) DEVICE — POSITIONER CUSHION INSERT PRONE VIEW LG

## (undated) DEVICE — SOL IRR POUR NS 0.9% 1000ML

## (undated) DEVICE — BIPOLAR FORCEP SYMMETRY BAYONET 7" X 1.5MM SMOOTH (SILVER)

## (undated) DEVICE — DRSG TEGADERM 4X4.75"

## (undated) DEVICE — CATH IV SAFE BC 18G X 1.16" (GREEN)

## (undated) DEVICE — VENODYNE/SCD SLEEVE CALF MEDIUM

## (undated) DEVICE — POSITIONER FOAM EGG CRATE ULNAR 2PCS (PINK)

## (undated) DEVICE — ELCTR BOVIE PENCIL BLADE 10FT

## (undated) DEVICE — DRAPE 3/4 SHEET WITH ADHESIVE 76" X 60"

## (undated) DEVICE — TUBING FOR SMOKE EVACUATOR (PURPLE END)

## (undated) DEVICE — DEVICE BONE DUST COLLECTION

## (undated) DEVICE — CATH SET RADIAL ARTERY 20G 1 3/4

## (undated) DEVICE — DRAPE STICKY U BLUE 60 X 84"

## (undated) DEVICE — WARMING BLANKET LOWER ADULT

## (undated) DEVICE — ELCTR GROUNDING PAD ADULT COVIDIEN

## (undated) DEVICE — SUT VICRYL PLUS 0 18" CT-1 UNDYED (POP-OFF)

## (undated) DEVICE — POSITIONER FOAM LAMINECTOMY ARM CRADLE (PINK)

## (undated) DEVICE — SPONGE PEANUT AUTO COUNT

## (undated) DEVICE — ELCTR BOVIE TIP NEEDLE INSULATED 6.5" EDGE

## (undated) DEVICE — PREP ALCOHOL PAD MED

## (undated) DEVICE — DRAIN JACKSON PRATT 3 SPRING RESERVOIR W 7FR PVC DRAIN

## (undated) DEVICE — POSITIONER JACKSON TABLE PRONEVIEW CUSHION, CHEST, HIP, THIGH PADS

## (undated) DEVICE — PACK NEURO

## (undated) DEVICE — SOL IRR POUR H2O 1000ML

## (undated) DEVICE — DRAPE TOWEL BLUE 17" X 24"

## (undated) DEVICE — SUT ETHILON 3-0 18" PS-1

## (undated) DEVICE — DRSG STERISTRIPS 0.5 X 4"

## (undated) DEVICE — GLV 7.5 PROTEXIS (WHITE)

## (undated) DEVICE — DRAPE XL SHEET 77X98"